# Patient Record
Sex: MALE | Race: WHITE | NOT HISPANIC OR LATINO
[De-identification: names, ages, dates, MRNs, and addresses within clinical notes are randomized per-mention and may not be internally consistent; named-entity substitution may affect disease eponyms.]

---

## 2018-05-15 ENCOUNTER — APPOINTMENT (OUTPATIENT)
Dept: DERMATOLOGY | Facility: CLINIC | Age: 53
End: 2018-05-15
Payer: COMMERCIAL

## 2018-05-15 VITALS — BODY MASS INDEX: 27.77 KG/M2 | WEIGHT: 205 LBS | HEIGHT: 72 IN

## 2018-05-15 DIAGNOSIS — Z00.00 ENCOUNTER FOR GENERAL ADULT MEDICAL EXAMINATION W/OUT ABNORMAL FINDINGS: ICD-10-CM

## 2018-05-15 DIAGNOSIS — Z86.79 PERSONAL HISTORY OF OTHER DISEASES OF THE CIRCULATORY SYSTEM: ICD-10-CM

## 2018-05-15 DIAGNOSIS — Z85.9 PERSONAL HISTORY OF MALIGNANT NEOPLASM, UNSPECIFIED: ICD-10-CM

## 2018-05-15 DIAGNOSIS — Z87.19 PERSONAL HISTORY OF OTHER DISEASES OF THE DIGESTIVE SYSTEM: ICD-10-CM

## 2018-05-15 PROCEDURE — 99213 OFFICE O/P EST LOW 20 MIN: CPT

## 2018-05-15 RX ORDER — SIMVASTATIN 5 MG/1
5 TABLET, FILM COATED ORAL
Refills: 0 | Status: ACTIVE | COMMUNITY

## 2018-05-15 RX ORDER — LOSARTAN POTASSIUM 100 MG/1
100 TABLET, FILM COATED ORAL
Refills: 0 | Status: ACTIVE | COMMUNITY

## 2018-10-30 ENCOUNTER — RX RENEWAL (OUTPATIENT)
Age: 53
End: 2018-10-30

## 2018-10-30 RX ORDER — VALACYCLOVIR HYDROCHLORIDE 500 MG/1
500 TABLET, FILM COATED ORAL TWICE DAILY
Qty: 30 | Refills: 3 | Status: ACTIVE | COMMUNITY
Start: 2018-05-15 | End: 1900-01-01

## 2019-06-07 ENCOUNTER — RX RENEWAL (OUTPATIENT)
Age: 54
End: 2019-06-07

## 2019-11-24 ENCOUNTER — RX RENEWAL (OUTPATIENT)
Age: 54
End: 2019-11-24

## 2020-01-27 ENCOUNTER — RX RENEWAL (OUTPATIENT)
Age: 55
End: 2020-01-27

## 2020-03-24 ENCOUNTER — APPOINTMENT (OUTPATIENT)
Dept: GASTROENTEROLOGY | Facility: CLINIC | Age: 55
End: 2020-03-24

## 2020-05-18 ENCOUNTER — RX RENEWAL (OUTPATIENT)
Age: 55
End: 2020-05-18

## 2020-08-27 ENCOUNTER — APPOINTMENT (OUTPATIENT)
Dept: GASTROENTEROLOGY | Facility: CLINIC | Age: 55
End: 2020-08-27
Payer: COMMERCIAL

## 2020-08-27 VITALS
BODY MASS INDEX: 27.09 KG/M2 | DIASTOLIC BLOOD PRESSURE: 84 MMHG | HEART RATE: 66 BPM | HEIGHT: 72 IN | WEIGHT: 200 LBS | SYSTOLIC BLOOD PRESSURE: 132 MMHG

## 2020-08-27 DIAGNOSIS — Z12.11 ENCOUNTER FOR SCREENING FOR MALIGNANT NEOPLASM OF COLON: ICD-10-CM

## 2020-08-27 PROCEDURE — 99213 OFFICE O/P EST LOW 20 MIN: CPT

## 2020-09-02 NOTE — HISTORY OF PRESENT ILLNESS
[de-identified] : 56yo male for f/u GERD\par He has been maintained on dexilant for years as only medication that works to prevent severe heartburn.  He will get occasional  breakthrough discomfort with dietary indiscretion\par He has never had screening colonoscopy\par He denies rectal bleeding or change in BM

## 2020-09-02 NOTE — PHYSICAL EXAM
[General Appearance - In No Acute Distress] : in no acute distress [General Appearance - Alert] : alert [Extraocular Movements] : extraocular movements were intact [PERRL With Normal Accommodation] : pupils were equal in size, round, and reactive to light [Sclera] : the sclera and conjunctiva were normal [Auscultation Breath Sounds / Voice Sounds] : lungs were clear to auscultation bilaterally [Heart Rate And Rhythm] : heart rate was normal and rhythm regular [Heart Sounds Gallop] : no gallops [Murmurs] : no murmurs [Heart Sounds] : normal S1 and S2 [Heart Sounds Pericardial Friction Rub] : no pericardial rub [Bowel Sounds] : normal bowel sounds [Abdomen Mass (___ Cm)] : no abdominal mass palpated [] : no hepato-splenomegaly [Abdomen Soft] : soft [Abdomen Tenderness] : non-tender [Abnormal Walk] : normal gait [Nail Clubbing] : no clubbing  or cyanosis of the fingernails [Oriented To Time, Place, And Person] : oriented to person, place, and time [Musculoskeletal - Swelling] : no joint swelling seen [Motor Tone] : muscle strength and tone were normal [Affect] : the affect was normal [Impaired Insight] : insight and judgment were intact

## 2020-09-02 NOTE — ASSESSMENT
[FreeTextEntry1] : 54yo male with gerd for initial screening colonoscopy\par check egd and colon clenpiq\par maintain dexilant for gerd\par Risks and benefits of procedure(s) discussed with patient in detail, including but not limited to, perforation, bleeding, reaction to anesthesia, missed lesions.\par

## 2020-09-02 NOTE — ASSESSMENT
[FreeTextEntry1] : 56yo male with gerd for initial screening colonoscopy\par check egd and colon clenpiq\par maintain dexilant for gerd\par Risks and benefits of procedure(s) discussed with patient in detail, including but not limited to, perforation, bleeding, reaction to anesthesia, missed lesions.\par  [FreeTextEntry1] : lab results\par  [de-identified] : patient here today to review and discuss labs results, no acute complains\par

## 2020-09-02 NOTE — HISTORY OF PRESENT ILLNESS
[de-identified] : 56yo male for f/u GERD\par He has been maintained on dexilant for years as only medication that works to prevent severe heartburn.  He will get occasional  breakthrough discomfort with dietary indiscretion\par He has never had screening colonoscopy\par He denies rectal bleeding or change in BM

## 2020-09-21 DIAGNOSIS — Z01.818 ENCOUNTER FOR OTHER PREPROCEDURAL EXAMINATION: ICD-10-CM

## 2020-09-22 ENCOUNTER — APPOINTMENT (OUTPATIENT)
Dept: DISASTER EMERGENCY | Facility: CLINIC | Age: 55
End: 2020-09-22

## 2020-09-23 ENCOUNTER — APPOINTMENT (OUTPATIENT)
Dept: DISASTER EMERGENCY | Facility: CLINIC | Age: 55
End: 2020-09-23

## 2020-09-23 LAB — SARS-COV-2 N GENE NPH QL NAA+PROBE: NOT DETECTED

## 2020-09-25 ENCOUNTER — APPOINTMENT (OUTPATIENT)
Dept: GASTROENTEROLOGY | Facility: AMBULATORY MEDICAL SERVICES | Age: 55
End: 2020-09-25
Payer: COMMERCIAL

## 2020-09-25 ENCOUNTER — RESULT REVIEW (OUTPATIENT)
Age: 55
End: 2020-09-25

## 2020-09-25 PROCEDURE — 45378 DIAGNOSTIC COLONOSCOPY: CPT

## 2020-09-25 PROCEDURE — 43239 EGD BIOPSY SINGLE/MULTIPLE: CPT

## 2020-10-11 ENCOUNTER — EMERGENCY (EMERGENCY)
Facility: HOSPITAL | Age: 55
LOS: 0 days | Discharge: HOME | End: 2020-10-12
Attending: EMERGENCY MEDICINE
Payer: COMMERCIAL

## 2020-10-11 VITALS — WEIGHT: 199.96 LBS | HEIGHT: 72 IN

## 2020-10-11 DIAGNOSIS — R07.89 OTHER CHEST PAIN: ICD-10-CM

## 2020-10-11 DIAGNOSIS — R00.1 BRADYCARDIA, UNSPECIFIED: ICD-10-CM

## 2020-10-11 DIAGNOSIS — R07.81 PLEURODYNIA: ICD-10-CM

## 2020-10-11 DIAGNOSIS — I10 ESSENTIAL (PRIMARY) HYPERTENSION: ICD-10-CM

## 2020-10-11 DIAGNOSIS — Z84.2 FAMILY HISTORY OF OTHER DISEASES OF THE GENITOURINARY SYSTEM: ICD-10-CM

## 2020-10-11 DIAGNOSIS — Z88.5 ALLERGY STATUS TO NARCOTIC AGENT: ICD-10-CM

## 2020-10-11 DIAGNOSIS — E78.49 OTHER HYPERLIPIDEMIA: ICD-10-CM

## 2020-10-11 PROCEDURE — 71046 X-RAY EXAM CHEST 2 VIEWS: CPT

## 2020-10-11 PROCEDURE — 82550 ASSAY OF CK (CPK): CPT

## 2020-10-11 PROCEDURE — 80053 COMPREHEN METABOLIC PANEL: CPT

## 2020-10-11 PROCEDURE — 84484 ASSAY OF TROPONIN QUANT: CPT

## 2020-10-11 PROCEDURE — 85379 FIBRIN DEGRADATION QUANT: CPT

## 2020-10-11 PROCEDURE — 93005 ELECTROCARDIOGRAM TRACING: CPT

## 2020-10-11 PROCEDURE — 85610 PROTHROMBIN TIME: CPT

## 2020-10-11 PROCEDURE — 99285 EMERGENCY DEPT VISIT HI MDM: CPT

## 2020-10-11 PROCEDURE — 93010 ELECTROCARDIOGRAM REPORT: CPT

## 2020-10-11 PROCEDURE — 85025 COMPLETE CBC W/AUTO DIFF WBC: CPT

## 2020-10-11 PROCEDURE — 36415 COLL VENOUS BLD VENIPUNCTURE: CPT

## 2020-10-11 PROCEDURE — 85730 THROMBOPLASTIN TIME PARTIAL: CPT

## 2020-10-11 PROCEDURE — 81003 URINALYSIS AUTO W/O SCOPE: CPT

## 2020-10-11 PROCEDURE — 99283 EMERGENCY DEPT VISIT LOW MDM: CPT | Mod: 25

## 2020-10-11 NOTE — ED ADULT TRIAGE NOTE - CHIEF COMPLAINT QUOTE
Pt presents to ER c/o intermittent left sided chest/rib pain. Onset of symptoms began around 1500 today spontaneously; denies exerting himself on onset. Denies SOB/injury/fall

## 2020-10-12 VITALS
RESPIRATION RATE: 18 BRPM | OXYGEN SATURATION: 100 % | TEMPERATURE: 98 F | SYSTOLIC BLOOD PRESSURE: 144 MMHG | DIASTOLIC BLOOD PRESSURE: 100 MMHG | HEART RATE: 57 BPM

## 2020-10-12 LAB
ALBUMIN SERPL ELPH-MCNC: 4.1 G/DL — SIGNIFICANT CHANGE UP (ref 3.3–5)
ALP SERPL-CCNC: 87 U/L — SIGNIFICANT CHANGE UP (ref 40–120)
ALT FLD-CCNC: 44 U/L — SIGNIFICANT CHANGE UP (ref 12–78)
ANION GAP SERPL CALC-SCNC: 4 MMOL/L — LOW (ref 5–17)
APPEARANCE UR: CLEAR — SIGNIFICANT CHANGE UP
APTT BLD: 29.5 SEC — SIGNIFICANT CHANGE UP (ref 27.5–35.5)
AST SERPL-CCNC: 30 U/L — SIGNIFICANT CHANGE UP (ref 15–37)
BASOPHILS # BLD AUTO: 0.11 K/UL — SIGNIFICANT CHANGE UP (ref 0–0.2)
BASOPHILS NFR BLD AUTO: 1.1 % — SIGNIFICANT CHANGE UP (ref 0–2)
BILIRUB SERPL-MCNC: 0.3 MG/DL — SIGNIFICANT CHANGE UP (ref 0.2–1.2)
BILIRUB UR-MCNC: NEGATIVE — SIGNIFICANT CHANGE UP
BUN SERPL-MCNC: 15 MG/DL — SIGNIFICANT CHANGE UP (ref 7–23)
CALCIUM SERPL-MCNC: 9.1 MG/DL — SIGNIFICANT CHANGE UP (ref 8.5–10.1)
CHLORIDE SERPL-SCNC: 109 MMOL/L — HIGH (ref 96–108)
CK SERPL-CCNC: 130 U/L — SIGNIFICANT CHANGE UP (ref 26–308)
CO2 SERPL-SCNC: 29 MMOL/L — SIGNIFICANT CHANGE UP (ref 22–31)
COLOR SPEC: YELLOW — SIGNIFICANT CHANGE UP
CREAT SERPL-MCNC: 0.97 MG/DL — SIGNIFICANT CHANGE UP (ref 0.5–1.3)
D DIMER BLD IA.RAPID-MCNC: <150 NG/ML DDU — SIGNIFICANT CHANGE UP
DIFF PNL FLD: NEGATIVE — SIGNIFICANT CHANGE UP
EOSINOPHIL # BLD AUTO: 0.61 K/UL — HIGH (ref 0–0.5)
EOSINOPHIL NFR BLD AUTO: 5.8 % — SIGNIFICANT CHANGE UP (ref 0–6)
GLUCOSE SERPL-MCNC: 78 MG/DL — SIGNIFICANT CHANGE UP (ref 70–99)
GLUCOSE UR QL: NEGATIVE MG/DL — SIGNIFICANT CHANGE UP
HCT VFR BLD CALC: 39.6 % — SIGNIFICANT CHANGE UP (ref 39–50)
HGB BLD-MCNC: 13.7 G/DL — SIGNIFICANT CHANGE UP (ref 13–17)
IMM GRANULOCYTES NFR BLD AUTO: 0.2 % — SIGNIFICANT CHANGE UP (ref 0–1.5)
INR BLD: 0.93 RATIO — SIGNIFICANT CHANGE UP (ref 0.88–1.16)
KETONES UR-MCNC: NEGATIVE — SIGNIFICANT CHANGE UP
LEUKOCYTE ESTERASE UR-ACNC: NEGATIVE — SIGNIFICANT CHANGE UP
LYMPHOCYTES # BLD AUTO: 3.76 K/UL — HIGH (ref 1–3.3)
LYMPHOCYTES # BLD AUTO: 36 % — SIGNIFICANT CHANGE UP (ref 13–44)
MCHC RBC-ENTMCNC: 30.2 PG — SIGNIFICANT CHANGE UP (ref 27–34)
MCHC RBC-ENTMCNC: 34.6 GM/DL — SIGNIFICANT CHANGE UP (ref 32–36)
MCV RBC AUTO: 87.2 FL — SIGNIFICANT CHANGE UP (ref 80–100)
MONOCYTES # BLD AUTO: 0.95 K/UL — HIGH (ref 0–0.9)
MONOCYTES NFR BLD AUTO: 9.1 % — SIGNIFICANT CHANGE UP (ref 2–14)
NEUTROPHILS # BLD AUTO: 4.98 K/UL — SIGNIFICANT CHANGE UP (ref 1.8–7.4)
NEUTROPHILS NFR BLD AUTO: 47.8 % — SIGNIFICANT CHANGE UP (ref 43–77)
NITRITE UR-MCNC: NEGATIVE — SIGNIFICANT CHANGE UP
PH UR: 6.5 — SIGNIFICANT CHANGE UP (ref 5–8)
PLATELET # BLD AUTO: 313 K/UL — SIGNIFICANT CHANGE UP (ref 150–400)
POTASSIUM SERPL-MCNC: 3.8 MMOL/L — SIGNIFICANT CHANGE UP (ref 3.5–5.3)
POTASSIUM SERPL-SCNC: 3.8 MMOL/L — SIGNIFICANT CHANGE UP (ref 3.5–5.3)
PROT SERPL-MCNC: 7.6 GM/DL — SIGNIFICANT CHANGE UP (ref 6–8.3)
PROT UR-MCNC: NEGATIVE MG/DL — SIGNIFICANT CHANGE UP
PROTHROM AB SERPL-ACNC: 10.8 SEC — SIGNIFICANT CHANGE UP (ref 10.6–13.6)
RBC # BLD: 4.54 M/UL — SIGNIFICANT CHANGE UP (ref 4.2–5.8)
RBC # FLD: 13.4 % — SIGNIFICANT CHANGE UP (ref 10.3–14.5)
SODIUM SERPL-SCNC: 142 MMOL/L — SIGNIFICANT CHANGE UP (ref 135–145)
SP GR SPEC: 1.01 — SIGNIFICANT CHANGE UP (ref 1.01–1.02)
TROPONIN I SERPL-MCNC: <0.015 NG/ML — SIGNIFICANT CHANGE UP (ref 0.01–0.04)
UROBILINOGEN FLD QL: NEGATIVE MG/DL — SIGNIFICANT CHANGE UP
WBC # BLD: 10.43 K/UL — SIGNIFICANT CHANGE UP (ref 3.8–10.5)
WBC # FLD AUTO: 10.43 K/UL — SIGNIFICANT CHANGE UP (ref 3.8–10.5)

## 2020-10-12 PROCEDURE — 71046 X-RAY EXAM CHEST 2 VIEWS: CPT | Mod: 26

## 2020-10-12 RX ADMIN — Medication 30 MILLILITER(S): at 01:50

## 2020-10-12 NOTE — ED PROVIDER NOTE - PATIENT PORTAL LINK FT
You can access the FollowMyHealth Patient Portal offered by [facility] by registering at the following website: [email or web address]. By joining [facility]’s FollowMyHealth portal, you will also be able to view your health information using other applications (apps) compatible with our system.

## 2020-10-12 NOTE — ED PROVIDER NOTE - FAMILY HISTORY
Sibling  Still living? Yes, Estimated age: Age Unknown  Family history of kidney stones, Age at diagnosis: Age Unknown

## 2020-10-12 NOTE — ED ADULT NURSE NOTE - OBJECTIVE STATEMENT
pt. presents to ER with c/o chest pain with onset 1hr PTA. Now pt. at 0/10. pt. denies SOB, Palpitations, cough, fevers, flank pain or hematuria.

## 2020-10-12 NOTE — ED PROVIDER NOTE - OBJECTIVE STATEMENT
Pt. is a 56 yo M with a hx of HTN and hyperlipidemia, last stress test with Dr. Mariano at [facility] 2 years ago presents with left sided rib pain.  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  Pain was intermittent, lasting 5-10 minutes.  Patient took 2 aspirin with some relief.  Daughter who is a nurse was concerned and brought him to the ED for evaluation. On arrival, patient had 0/10 chest pain.  Pain is nonradiating.  Patient is a nonsmoker.  Brother has hx of kidney stones.   Patient denies fever, SOB, coughing, back pain, abdominal pain, dysuria, hematuria, nausea or vomiting. No rash.

## 2020-10-12 NOTE — ED PROVIDER NOTE - PROGRESS NOTE DETAILS
Patient had an episode of pain again in left ribs.  Maalox given.  Currently without any pain. Does not wish to have tylenol or toradol while in ED for pain.

## 2020-10-12 NOTE — ED PROVIDER NOTE - CLINICAL SUMMARY MEDICAL DECISION MAKING FREE TEXT BOX
Chest pain in adult; EKG normal; labs sent and d dimer and troponin negative.  If 2nd cardiac enzyme is negative, patient may take ibuprofen at home and f/u with PMD or his cardiologist in 1-2 days.

## 2020-10-12 NOTE — ED PROVIDER NOTE - CARE PROVIDERS DIRECT ADDRESSES
[email or web address]

## 2020-10-12 NOTE — ED ADULT NURSE REASSESSMENT NOTE - NS ED NURSE REASSESS COMMENT FT1
pt. noted resting comfortably in stretcher, no distress noted. denies pain/discomfort. safety maintained. WCTM. pt. is on tele, second troponins pending.

## 2020-10-12 NOTE — ED PROVIDER NOTE - CARE PROVIDER_API CALL
Gloria Álvarez  INTERNAL MEDICINE  [address] [place] [address]  Phone: [phone]  Fax: [phone]  Follow Up Time:

## 2020-10-12 NOTE — ED PROVIDER NOTE - CARDIAC, MLM
Normal rate, regular rhythm.  Heart sounds S1, S2.  No murmurs, rubs or gallops. CHEST WALL: +reproducible localized chest wall tenderness of left lateral rib #7-8

## 2020-10-12 NOTE — ED ADULT NURSE NOTE - CHPI ED NUR SYMPTOMS NEG
no nausea/no fever/no syncope/no back pain/no chills/no congestion/no diaphoresis/no shortness of breath/no vomiting/no dizziness

## 2020-10-31 ENCOUNTER — TRANSCRIPTION ENCOUNTER (OUTPATIENT)
Age: 55
End: 2020-10-31

## 2020-11-16 ENCOUNTER — RX RENEWAL (OUTPATIENT)
Age: 55
End: 2020-11-16

## 2020-12-07 ENCOUNTER — OUTPATIENT (OUTPATIENT)
Dept: OUTPATIENT SERVICES | Facility: HOSPITAL | Age: 55
LOS: 1 days | End: 2020-12-07
Payer: COMMERCIAL

## 2020-12-07 DIAGNOSIS — Z11.59 ENCOUNTER FOR SCREENING FOR OTHER VIRAL DISEASES: ICD-10-CM

## 2020-12-07 PROBLEM — I10 ESSENTIAL (PRIMARY) HYPERTENSION: Chronic | Status: ACTIVE | Noted: 2020-10-12

## 2020-12-07 PROBLEM — E78.49 OTHER HYPERLIPIDEMIA: Chronic | Status: ACTIVE | Noted: 2020-10-12

## 2020-12-07 LAB — SARS-COV-2 RNA SPEC QL NAA+PROBE: SIGNIFICANT CHANGE UP

## 2020-12-07 PROCEDURE — U0003: CPT

## 2020-12-08 DIAGNOSIS — Z11.59 ENCOUNTER FOR SCREENING FOR OTHER VIRAL DISEASES: ICD-10-CM

## 2020-12-21 ENCOUNTER — TRANSCRIPTION ENCOUNTER (OUTPATIENT)
Age: 55
End: 2020-12-21

## 2021-01-04 NOTE — ED ADULT NURSE NOTE - FINAL NURSING ELECTRONIC SIGNATURE
Severe asthma, unspecified whether complicated, unspecified whether persistent Severe asthma, unspecified whether complicated, unspecified whether persistent 12-Oct-2020 04:09

## 2021-05-17 ENCOUNTER — RX RENEWAL (OUTPATIENT)
Age: 56
End: 2021-05-17

## 2021-05-26 ENCOUNTER — RX RENEWAL (OUTPATIENT)
Age: 56
End: 2021-05-26

## 2021-06-29 ENCOUNTER — APPOINTMENT (OUTPATIENT)
Dept: DERMATOLOGY | Facility: CLINIC | Age: 56
End: 2021-06-29
Payer: COMMERCIAL

## 2021-06-29 PROCEDURE — 99072 ADDL SUPL MATRL&STAF TM PHE: CPT

## 2021-06-29 PROCEDURE — 99203 OFFICE O/P NEW LOW 30 MIN: CPT

## 2021-06-29 RX ORDER — LEVOCETIRIZINE DIHYDROCHLORIDE 5 MG/1
5 TABLET, FILM COATED ORAL
Refills: 0 | Status: DISCONTINUED | COMMUNITY
End: 2021-06-29

## 2021-06-29 RX ORDER — FEXOFENADINE HCL 180 MG
180 TABLET ORAL
Refills: 0 | Status: ACTIVE | COMMUNITY

## 2021-06-29 NOTE — ASSESSMENT
[FreeTextEntry1] : A complete skin examination was performed.  There is no evidence of skin cancer.  We discussed the importance of photoprotection, including the use of hats, protective clothing and sunscreens with an SPF of at least 30.  Sun avoidance was also discussed.  The ABCDE's of melanoma was discussed.  Regular skin exams recommended.\par \par Call if changes or irritation of moles.  Discussed at length.

## 2021-06-29 NOTE — PHYSICAL EXAM
[Alert] : alert [Oriented x 3] : ~L oriented x 3 [Well Nourished] : well nourished [Full Body Skin Exam Performed] : performed [FreeTextEntry3] : A full skin exam was performed including the scalp, face (including lips, ears, nose and eyes), neck, chest, abdomen, back, buttocks, upper extremities and lower extremities.  The patient declined examination of the genitalia.  \par The exam revealed the following benign growths:\par Dougherty pigmented nevi - includes 9 mm darkly and homogeneously hyperpigmented macule on the right superior shoulder.  ELM bland.  Also with 3 mm dome shaped mildly hyperpigmented papule, right foot, in the great and 2nd toe web space.\par Cherry angioma - trunk.\par

## 2021-06-29 NOTE — HISTORY OF PRESENT ILLNESS
[FreeTextEntry1] : Patient presents for skin examination. [de-identified] : Notes many moles, but denies changes, bleeding, growth or new lesions.

## 2021-09-24 ENCOUNTER — TRANSCRIPTION ENCOUNTER (OUTPATIENT)
Age: 56
End: 2021-09-24

## 2021-11-21 ENCOUNTER — TRANSCRIPTION ENCOUNTER (OUTPATIENT)
Age: 56
End: 2021-11-21

## 2021-11-22 ENCOUNTER — RX RENEWAL (OUTPATIENT)
Age: 56
End: 2021-11-22

## 2021-11-24 ENCOUNTER — TRANSCRIPTION ENCOUNTER (OUTPATIENT)
Age: 56
End: 2021-11-24

## 2021-11-27 ENCOUNTER — TRANSCRIPTION ENCOUNTER (OUTPATIENT)
Age: 56
End: 2021-11-27

## 2021-11-28 ENCOUNTER — TRANSCRIPTION ENCOUNTER (OUTPATIENT)
Age: 56
End: 2021-11-28

## 2021-11-30 ENCOUNTER — OUTPATIENT (OUTPATIENT)
Dept: OUTPATIENT SERVICES | Facility: HOSPITAL | Age: 56
LOS: 1 days | End: 2021-11-30
Payer: COMMERCIAL

## 2021-11-30 ENCOUNTER — APPOINTMENT (OUTPATIENT)
Dept: DISASTER EMERGENCY | Facility: HOSPITAL | Age: 56
End: 2021-11-30

## 2021-11-30 VITALS
TEMPERATURE: 99 F | OXYGEN SATURATION: 98 % | HEART RATE: 64 BPM | RESPIRATION RATE: 18 BRPM | SYSTOLIC BLOOD PRESSURE: 134 MMHG | DIASTOLIC BLOOD PRESSURE: 85 MMHG

## 2021-11-30 VITALS
SYSTOLIC BLOOD PRESSURE: 172 MMHG | OXYGEN SATURATION: 99 % | TEMPERATURE: 99 F | HEIGHT: 72 IN | HEART RATE: 72 BPM | WEIGHT: 199.96 LBS | DIASTOLIC BLOOD PRESSURE: 94 MMHG | RESPIRATION RATE: 18 BRPM

## 2021-11-30 DIAGNOSIS — U07.1 COVID-19: ICD-10-CM

## 2021-11-30 PROCEDURE — M0243: CPT

## 2021-11-30 RX ORDER — SODIUM CHLORIDE 9 MG/ML
250 INJECTION INTRAMUSCULAR; INTRAVENOUS; SUBCUTANEOUS
Refills: 0 | Status: COMPLETED | OUTPATIENT
Start: 2021-11-30 | End: 2021-11-30

## 2021-11-30 RX ADMIN — SODIUM CHLORIDE 310 MILLILITER(S): 9 INJECTION INTRAMUSCULAR; INTRAVENOUS; SUBCUTANEOUS at 12:19

## 2021-11-30 NOTE — CHART NOTE - NSCHARTNOTEFT_GEN_A_CORE
CC: Monoclonal Antibody Infusion/COVID 19 Positive on 11/27/21      History: Patient presents for infusion of monoclonal antibody infusion. Patient has been screened and was deemed to be a candidate.    Symptoms/ Criteria: Cough, HA,    Exposure: wife    Vaccination Status: Pfizer x 2    Risk Profile includes: HTN, HLD          T(C): 37.2 (11-30-21 @ 11:16), Max: 37.2 (11-30-21 @ 11:16)  HR: 72 (11-30-21 @ 11:16) (72 - 72)  BP: 172/94 (11-30-21 @ 11:16) (172/94 - 172/94)  RR: 18 (11-30-21 @ 11:16) (18 - 18)  SpO2: 99% (11-30-21 @ 11:16) (99% - 99%)      PE:   Appearance: NAD	  HEENT:   Normal oral mucosa.   Lymphatic: No lymphadenopathy  Cardiovascular: Normal S1 S2, No JVD, No murmurs, No edema  Respiratory: Lungs clear to auscultation	  Gastrointestinal:  Soft, Non-tender. No guarding   Skin: warm and dry  Neurologic: Non-focal  Extremities: Normal range of motion.     ASSESSMENT:  Pt is a 56y year old Male Covid +  referred to the infusion center for Monoclonal antibody infusion (Regeneron).        PLAN:  - infusion procedure explained to patient   - Consent for monoclonal antibody infusion obtained   - Risk & benefits discussed/all questions answered  - infuse Regeneron over one hour   - will observe patient for one hour post infusion  and then if stable discharge home with oupt follow up as planned by PMD.
Discharge:  T(C): 37.4 (11-30-21 @ 13:40), Max: 37.4 (11-30-21 @ 13:40)  HR: 64 (11-30-21 @ 13:40) (60 - 72)  BP: 134/85 (11-30-21 @ 13:40) (134/85 - 172/94)  RR: 18 (11-30-21 @ 13:40) (18 - 18)  SpO2: 98% (11-30-21 @ 13:40) (98% - 99%)    Patient tolerated infusion well denies complaints of chest pain/SOB/dizzines/ palps  VSS for discharge home  D/C instructions given/ fact sheet included.  Patient to follow-up with PCP as needed

## 2021-12-01 ENCOUNTER — TRANSCRIPTION ENCOUNTER (OUTPATIENT)
Age: 56
End: 2021-12-01

## 2022-02-08 RX ORDER — VALACYCLOVIR 500 MG/1
500 TABLET, FILM COATED ORAL
Qty: 30 | Refills: 4 | Status: ACTIVE | COMMUNITY
Start: 2020-01-27 | End: 1900-01-01

## 2022-05-11 ENCOUNTER — NON-APPOINTMENT (OUTPATIENT)
Age: 57
End: 2022-05-11

## 2022-05-12 ENCOUNTER — APPOINTMENT (OUTPATIENT)
Dept: DERMATOLOGY | Facility: CLINIC | Age: 57
End: 2022-05-12
Payer: COMMERCIAL

## 2022-05-12 DIAGNOSIS — B00.9 HERPESVIRAL INFECTION, UNSPECIFIED: ICD-10-CM

## 2022-05-12 PROCEDURE — 99213 OFFICE O/P EST LOW 20 MIN: CPT

## 2022-05-12 NOTE — HISTORY OF PRESENT ILLNESS
[FreeTextEntry1] : Patient presents for skin examination. [de-identified] : Notes increasing number of cold sores, approx. 4 episodes this year so far, all upper lip.

## 2022-05-12 NOTE — PHYSICAL EXAM
[Alert] : alert [Oriented x 3] : ~L oriented x 3 [Well Nourished] : well nourished [Full Body Skin Exam Performed] : performed [FreeTextEntry3] : A full skin exam was performed including the scalp, face (including lips, ears, nose and eyes), neck, chest, abdomen, back, buttocks, upper extremities and lower extremities.  The patient declined examination of the genitalia.  \par The exam revealed the following benign growths:\par Clark pigmented nevi - includes 9.7 mm hyperpigmented macule, superior to the right clavicle.  Color is homogeneous and border is smooth.  ELM without haze, regression, pseudopods or telangiectasias.\par Cherry angioma.\par \par Small crust, upper lip.

## 2022-05-12 NOTE — ASSESSMENT
[FreeTextEntry1] : A complete skin examination was performed.  There is no evidence of skin cancer.  We discussed the importance of photoprotection, including the use of hats, protective clothing and sunscreens with an SPF of at least 30.  Sun avoidance was also discussed.  The ABCDE's of melanoma was discussed.  Regular skin exams recommended.\par \par Benign pigmented nevi - includes the right superior shoulder region.\par No significant change from 2021.\par Education - follow.  Patient to call if he notes any change to this or other nevi.\par \par H. simplex.\par Numerous flares.  Will tx with suppressive tx.\par Valtrex 500mg qd.   If flares, take bid x 5 days, then resume qd tx.

## 2022-06-02 ENCOUNTER — NON-APPOINTMENT (OUTPATIENT)
Age: 57
End: 2022-06-02

## 2022-09-11 ENCOUNTER — INPATIENT (INPATIENT)
Facility: HOSPITAL | Age: 57
LOS: 0 days | Discharge: HOME | DRG: 313 | End: 2022-09-12
Attending: FAMILY MEDICINE | Admitting: HOSPITALIST
Payer: COMMERCIAL

## 2022-09-11 VITALS
SYSTOLIC BLOOD PRESSURE: 156 MMHG | TEMPERATURE: 98 F | OXYGEN SATURATION: 97 % | RESPIRATION RATE: 18 BRPM | DIASTOLIC BLOOD PRESSURE: 90 MMHG | HEART RATE: 65 BPM

## 2022-09-11 DIAGNOSIS — R07.9 CHEST PAIN, UNSPECIFIED: ICD-10-CM

## 2022-09-11 LAB
ALBUMIN SERPL ELPH-MCNC: 4.3 G/DL — SIGNIFICANT CHANGE UP (ref 3.3–5)
ALP SERPL-CCNC: 87 U/L — SIGNIFICANT CHANGE UP (ref 40–120)
ALT FLD-CCNC: 68 U/L — SIGNIFICANT CHANGE UP (ref 12–78)
ANION GAP SERPL CALC-SCNC: 7 MMOL/L — SIGNIFICANT CHANGE UP (ref 5–17)
AST SERPL-CCNC: 33 U/L — SIGNIFICANT CHANGE UP (ref 15–37)
BASOPHILS # BLD AUTO: 0.08 K/UL — SIGNIFICANT CHANGE UP (ref 0–0.2)
BASOPHILS NFR BLD AUTO: 0.8 % — SIGNIFICANT CHANGE UP (ref 0–2)
BILIRUB SERPL-MCNC: 0.7 MG/DL — SIGNIFICANT CHANGE UP (ref 0.2–1.2)
BUN SERPL-MCNC: 15 MG/DL — SIGNIFICANT CHANGE UP (ref 7–23)
CALCIUM SERPL-MCNC: 9.3 MG/DL — SIGNIFICANT CHANGE UP (ref 8.5–10.1)
CHLORIDE SERPL-SCNC: 98 MMOL/L — SIGNIFICANT CHANGE UP (ref 96–108)
CO2 SERPL-SCNC: 26 MMOL/L — SIGNIFICANT CHANGE UP (ref 22–31)
CREAT SERPL-MCNC: 0.97 MG/DL — SIGNIFICANT CHANGE UP (ref 0.5–1.3)
D DIMER BLD IA.RAPID-MCNC: <150 NG/ML DDU — SIGNIFICANT CHANGE UP
EGFR: 92 ML/MIN/1.73M2 — SIGNIFICANT CHANGE UP
EOSINOPHIL # BLD AUTO: 0.41 K/UL — SIGNIFICANT CHANGE UP (ref 0–0.5)
EOSINOPHIL NFR BLD AUTO: 4.1 % — SIGNIFICANT CHANGE UP (ref 0–6)
GLUCOSE SERPL-MCNC: 89 MG/DL — SIGNIFICANT CHANGE UP (ref 70–99)
HCT VFR BLD CALC: 38 % — LOW (ref 39–50)
HGB BLD-MCNC: 13.6 G/DL — SIGNIFICANT CHANGE UP (ref 13–17)
IMM GRANULOCYTES NFR BLD AUTO: 0.2 % — SIGNIFICANT CHANGE UP (ref 0–1.5)
LIDOCAIN IGE QN: 124 U/L — SIGNIFICANT CHANGE UP (ref 73–393)
LYMPHOCYTES # BLD AUTO: 3.25 K/UL — SIGNIFICANT CHANGE UP (ref 1–3.3)
LYMPHOCYTES # BLD AUTO: 32.8 % — SIGNIFICANT CHANGE UP (ref 13–44)
MAGNESIUM SERPL-MCNC: 2.1 MG/DL — SIGNIFICANT CHANGE UP (ref 1.6–2.6)
MCHC RBC-ENTMCNC: 31.2 PG — SIGNIFICANT CHANGE UP (ref 27–34)
MCHC RBC-ENTMCNC: 35.8 GM/DL — SIGNIFICANT CHANGE UP (ref 32–36)
MCV RBC AUTO: 87.2 FL — SIGNIFICANT CHANGE UP (ref 80–100)
MONOCYTES # BLD AUTO: 0.72 K/UL — SIGNIFICANT CHANGE UP (ref 0–0.9)
MONOCYTES NFR BLD AUTO: 7.3 % — SIGNIFICANT CHANGE UP (ref 2–14)
NEUTROPHILS # BLD AUTO: 5.42 K/UL — SIGNIFICANT CHANGE UP (ref 1.8–7.4)
NEUTROPHILS NFR BLD AUTO: 54.8 % — SIGNIFICANT CHANGE UP (ref 43–77)
NT-PROBNP SERPL-SCNC: 37 PG/ML — SIGNIFICANT CHANGE UP (ref 0–125)
PLATELET # BLD AUTO: 281 K/UL — SIGNIFICANT CHANGE UP (ref 150–400)
POTASSIUM SERPL-MCNC: 3.6 MMOL/L — SIGNIFICANT CHANGE UP (ref 3.5–5.3)
POTASSIUM SERPL-SCNC: 3.6 MMOL/L — SIGNIFICANT CHANGE UP (ref 3.5–5.3)
PROT SERPL-MCNC: 7.8 GM/DL — SIGNIFICANT CHANGE UP (ref 6–8.3)
RBC # BLD: 4.36 M/UL — SIGNIFICANT CHANGE UP (ref 4.2–5.8)
RBC # FLD: 12.6 % — SIGNIFICANT CHANGE UP (ref 10.3–14.5)
SARS-COV-2 RNA SPEC QL NAA+PROBE: SIGNIFICANT CHANGE UP
SODIUM SERPL-SCNC: 131 MMOL/L — LOW (ref 135–145)
TROPONIN I, HIGH SENSITIVITY RESULT: 11 NG/L — SIGNIFICANT CHANGE UP
TROPONIN I, HIGH SENSITIVITY RESULT: 13.91 NG/L — SIGNIFICANT CHANGE UP
WBC # BLD: 9.9 K/UL — SIGNIFICANT CHANGE UP (ref 3.8–10.5)
WBC # FLD AUTO: 9.9 K/UL — SIGNIFICANT CHANGE UP (ref 3.8–10.5)

## 2022-09-11 PROCEDURE — 85610 PROTHROMBIN TIME: CPT

## 2022-09-11 PROCEDURE — 84484 ASSAY OF TROPONIN QUANT: CPT

## 2022-09-11 PROCEDURE — 71275 CT ANGIOGRAPHY CHEST: CPT | Mod: 26

## 2022-09-11 PROCEDURE — 80061 LIPID PANEL: CPT

## 2022-09-11 PROCEDURE — 85025 COMPLETE CBC W/AUTO DIFF WBC: CPT

## 2022-09-11 PROCEDURE — 93010 ELECTROCARDIOGRAM REPORT: CPT

## 2022-09-11 PROCEDURE — 74174 CTA ABD&PLVS W/CONTRAST: CPT | Mod: 26

## 2022-09-11 PROCEDURE — 83036 HEMOGLOBIN GLYCOSYLATED A1C: CPT

## 2022-09-11 PROCEDURE — 74174 CTA ABD&PLVS W/CONTRAST: CPT | Mod: MA

## 2022-09-11 PROCEDURE — 71275 CT ANGIOGRAPHY CHEST: CPT | Mod: MA

## 2022-09-11 PROCEDURE — 71046 X-RAY EXAM CHEST 2 VIEWS: CPT | Mod: 26

## 2022-09-11 PROCEDURE — 80053 COMPREHEN METABOLIC PANEL: CPT

## 2022-09-11 PROCEDURE — 99285 EMERGENCY DEPT VISIT HI MDM: CPT

## 2022-09-11 PROCEDURE — 93306 TTE W/DOPPLER COMPLETE: CPT

## 2022-09-11 PROCEDURE — 99222 1ST HOSP IP/OBS MODERATE 55: CPT

## 2022-09-11 PROCEDURE — 36415 COLL VENOUS BLD VENIPUNCTURE: CPT

## 2022-09-11 RX ORDER — ACETAMINOPHEN 500 MG
650 TABLET ORAL EVERY 6 HOURS
Refills: 0 | Status: DISCONTINUED | OUTPATIENT
Start: 2022-09-11 | End: 2022-09-12

## 2022-09-11 RX ORDER — SODIUM CHLORIDE 9 MG/ML
1000 INJECTION INTRAMUSCULAR; INTRAVENOUS; SUBCUTANEOUS
Refills: 0 | Status: DISCONTINUED | OUTPATIENT
Start: 2022-09-11 | End: 2022-09-12

## 2022-09-11 RX ORDER — HYDROCHLOROTHIAZIDE 25 MG
12.5 TABLET ORAL DAILY
Refills: 0 | Status: DISCONTINUED | OUTPATIENT
Start: 2022-09-11 | End: 2022-09-12

## 2022-09-11 RX ORDER — OLMESARTAN MEDOXOMIL-HYDROCHLOROTHIAZIDE 25; 40 MG/1; MG/1
1 TABLET, FILM COATED ORAL
Qty: 0 | Refills: 0 | DISCHARGE

## 2022-09-11 RX ORDER — ATORVASTATIN CALCIUM 80 MG/1
10 TABLET, FILM COATED ORAL AT BEDTIME
Refills: 0 | Status: DISCONTINUED | OUTPATIENT
Start: 2022-09-11 | End: 2022-09-12

## 2022-09-11 RX ORDER — PANTOPRAZOLE SODIUM 20 MG/1
40 TABLET, DELAYED RELEASE ORAL
Refills: 0 | Status: DISCONTINUED | OUTPATIENT
Start: 2022-09-11 | End: 2022-09-12

## 2022-09-11 RX ORDER — LANOLIN ALCOHOL/MO/W.PET/CERES
3 CREAM (GRAM) TOPICAL AT BEDTIME
Refills: 0 | Status: DISCONTINUED | OUTPATIENT
Start: 2022-09-11 | End: 2022-09-12

## 2022-09-11 RX ORDER — NITROGLYCERIN 6.5 MG
0.4 CAPSULE, EXTENDED RELEASE ORAL
Refills: 0 | Status: DISCONTINUED | OUTPATIENT
Start: 2022-09-11 | End: 2022-09-12

## 2022-09-11 RX ORDER — DEXLANSOPRAZOLE 30 MG/1
1 CAPSULE, DELAYED RELEASE ORAL
Qty: 0 | Refills: 0 | DISCHARGE

## 2022-09-11 RX ORDER — ONDANSETRON 8 MG/1
4 TABLET, FILM COATED ORAL EVERY 8 HOURS
Refills: 0 | Status: DISCONTINUED | OUTPATIENT
Start: 2022-09-11 | End: 2022-09-12

## 2022-09-11 RX ORDER — LOSARTAN POTASSIUM 100 MG/1
100 TABLET, FILM COATED ORAL DAILY
Refills: 0 | Status: DISCONTINUED | OUTPATIENT
Start: 2022-09-11 | End: 2022-09-12

## 2022-09-11 RX ADMIN — ATORVASTATIN CALCIUM 10 MILLIGRAM(S): 80 TABLET, FILM COATED ORAL at 23:18

## 2022-09-11 RX ADMIN — SODIUM CHLORIDE 100 MILLILITER(S): 9 INJECTION INTRAMUSCULAR; INTRAVENOUS; SUBCUTANEOUS at 23:18

## 2022-09-11 NOTE — PATIENT PROFILE ADULT - FALL HARM RISK - UNIVERSAL INTERVENTIONS
Bed in lowest position, wheels locked, appropriate side rails in place/Call bell, personal items and telephone in reach/Instruct patient to call for assistance before getting out of bed or chair/Non-slip footwear when patient is out of bed/[place] to call system/Physically safe environment - no spills, clutter or unnecessary equipment/Purposeful Proactive Rounding/Room/bathroom lighting operational, light cord in reach

## 2022-09-11 NOTE — H&P ADULT - HISTORY OF PRESENT ILLNESS
57 year old male patient with a pertinent past medical history noted for HTN and HLD presented to the ED complaining of a sudden onset of a left sided chest pain. Patient endorses a sharp pain that radiates to his back and is associated with an odd sensation to his left arm( states it is not numb but it also does not feel normal). Patient states he was ambulating to get a glass of water when pain began. Denies any associated shortness of breath, dizziness, pre-syncope, abdominal pain, nausea, vomiting. He does endorse helping his daughter to move to a new apartment 2 weeks ago. Patient states he is scheduled to follow up with a Cardiologist this Month regarding a stress test.

## 2022-09-11 NOTE — ED ADULT TRIAGE NOTE - CHIEF COMPLAINT QUOTE
Pt presents to ED for chest pain that started around 3pm today. Pt states that the pain is stabbing and radiates through to his back. Pt states pain is relieved but is now feeling left arm numbness. no code sroke called by Dr. Shields. EKG in process.

## 2022-09-11 NOTE — H&P ADULT - NSICDXFAMILYHX_GEN_ALL_CORE_FT
FAMILY HISTORY:  Mother  Still living? Unknown  Family history of TIAs, Age at diagnosis: Age Unknown  FH: atrial fibrillation, Age at diagnosis: Age Unknown    Sibling  Still living? Yes, Estimated age: Age Unknown  Family history of kidney stones, Age at diagnosis: Age Unknown

## 2022-09-11 NOTE — ED STATDOCS - OBJECTIVE STATEMENT
57 year old male with PMHx of HTN, HLD, shingles presents to the ED c/o left sided chest pain. At about 3:45 pm today pt began having stabbing left sided chest pain radiating to left shoulder with associated abnormal feeling in left arm. Pt states that the pain persisted for about 4-5 minutes until subsiding. After the initial episode, he has had intermittent "twinges" of chest pain. Denies worsening pain with movement, breathing, change of position. Wife notes pt was clammy Denies feeling SOB, lightheadedness during episode. Wife states that pt does appear off currently. Pt drank EtOH last night and notes some nausea this morning. Denies current episode feeling like prior onset of shingles, as the pain felt different but was on the left side according to prior note. Pt had appointment with cardio last month and has stress test scheduled. Denies smoking. FMHx of Afib, mini strokes, HTN, HLD. Denies FMHx of MI or CAD. Denies fever, cough, sick contacts. Has not had fever, cold or cough over past few weeks. Pt had a lot of heavy lifting while helping daughter move a few weeks ago. Pt is on Aspirin but denies h/o blood clots. Pt also reports traveling every weekend for the past four weekends.  Cardiologist Dr. Shira Segura

## 2022-09-11 NOTE — ED STATDOCS - NS ED ATTENDING STATEMENT MOD
This was a shared visit with the ROSLYN. I reviewed and verified the documentation and independently performed the documented:

## 2022-09-11 NOTE — H&P ADULT - ASSESSMENT
57 year old male patient with Chest pain      -Monitor on Tele overnight        # Chest pain  -patient now pain free but with risk factors  -chest A1C, lipid panel  -serial troponins  -serial EKGs  -get morning echo  -may benefit from a stress test  -Cardiology to evaluate pt    # Hyponatremia  -trial of IVF hydration  -trend sodium    # HTN  -on hctz    # HLD  -on statin    # DVT ppx  -scds    # Disposition  -likely discharge home on 09/12/2022

## 2022-09-11 NOTE — ED STATDOCS - PROGRESS NOTE DETAILS
57 year old male with PMHx of HTN, HLD, shingles presents to the ED c/o left sided chest pain. At about 3:45 pm today pt began having stabbing left sided chest pain radiating to left shoulder with associated abnormal feeling in left arm. Pt states that the pain persisted for about 4-5 minutes until subsiding. After the initial episode, he has had intermittent "twinges" of chest pain. Denies worsening pain with movement, breathing, change of position. Wife notes pt was clammy Denies feeling SOB, lightheadedness during episode. Wife states that pt does appear off currently. Pt drank EtOH last night and notes some nausea this morning. Denies current episode feeling like prior onset of shingles, as the pain felt different but was on the left side according to prior note. Pt had appointment with cardio last month and has stress test scheduled. Denies smoking. FMHx of Afib, mini strokes, HTN, HLD. Denies FMHx of MI or CAD. Denies fever, cough, sick contacts. Has not had fever, cold or cough over past few weeks. Pt had a lot of heavy lifting while helping daughter move a few weeks ago. Pt is on Aspirin but denies h/o blood clots. Pt also reports traveling every weekend for the past four weekends. Cardiac score 4. Spoke with hospitalist dr. Medrano, will admit patient for r/o AMI.  ~Mateo Bob PA-C PA: Patient is a 57 year old male with PMHx of HTN, HLD, shingles who presents to [facility] c/o left sided chest pain radiating to left arm x 1 day. Patient reports heaviness in left arm. At about 3:45 pm today pt began having stabbing left sided chest pain radiating to left shoulder with associated abnormal feeling in left arm. Patient has had intermittent "twinges" of chest pain. Denies worsening pain with movement, breathing, change of position. Wife notes pt was clammy. Denies feeling SOB, lightheadedness during episode. Pt had appointment with cardio last month and has stress test scheduled. Last stress test 4 years ago. FMHx of Afib, mini strokes, HTN, HLD. Denies fever, cough, sick contacts. Has not had fever, cold or cough over past few weeks. Pt had a lot of heavy lifting while helping daughter move a few weeks ago. Pt is on Aspirin but denies h/o blood clots. Pt also reports traveling every weekend for the past four weekends. Non-smoker. ~Mateo Bob PA-C

## 2022-09-11 NOTE — ED STATDOCS - PHYSICAL EXAMINATION
PA NOTE: GEN: AOX3, NAD. HEENT: Throat clear. Airway is patent. EYES: PERRLA. EOMI. Head: NC/AT. NECK: Supple, No JVD. FROM. C-spine non-tender. CV:S1S2, RRR, LUNGS: Non-labored breathing, no tachypnea. O2sat 100% RA. CTA b/l. No w/r/r. CHEST: Equal chest expansion and rise. No deformity. +Mild MSK tenderness left upper chest wall. ABD: Soft, NT/ND, no rebound, no guarding. No CVAT. EXT: No e/c/c. 2+ distal pulses. SKIN: No rashes. NEURO: No focal deficits. CN II-XII intact. FROM. 5/5 motor and sensory. ~Mateo Bob PA-C

## 2022-09-11 NOTE — H&P ADULT - NEGATIVE GASTROINTESTINAL SYMPTOMS
Stephane is here today for chronic gout attacks and to establish care.    Pre-visit Screening:  Immunizations:  up to date  Colonoscopy:  NA  Mammogram: NA  Asthma Action Test/Plan:  NA  PHQ9:  NA  GAD7:  NA  Questioned patient about current smoking habits Pt. quit smoking some time ago.  Ok to leave detailed message on voice mail for today's visit only Yes, phone # [phone]      
no nausea/no vomiting

## 2022-09-11 NOTE — H&P ADULT - NSHPPHYSICALEXAM_GEN_ALL_CORE
Vital Signs Last 24 Hrs  T(C): 36.8 (11 Sep 2022 18:02), Max: 36.8 (11 Sep 2022 18:02)  T(F): 98.2 (11 Sep 2022 18:02), Max: 98.2 (11 Sep 2022 18:02)  HR: 65 (11 Sep 2022 18:02) (65 - 65)  BP: 156/90 (11 Sep 2022 18:02) (156/90 - 156/90)  BP(mean): 109 (11 Sep 2022 18:02) (109 - 109)  RR: 18 (11 Sep 2022 18:02) (18 - 18)  SpO2: 97% (11 Sep 2022 18:02) (97% - 97%)    Parameters below as of 11 Sep 2022 18:02  Patient On (Oxygen Delivery Method): room air

## 2022-09-11 NOTE — ED ADULT NURSE NOTE - OBJECTIVE STATEMENT
Pt presents to ED for chest pain that started around 3pm today. Pt states that the pain is stabbing and radiates through to his back. Pt states pain is relieved but is now feeling left arm numbness/weakness

## 2022-09-11 NOTE — ED STATDOCS - NSICDXFAMILYHX_GEN_ALL_CORE_FT
FAMILY HISTORY:  Sibling  Still living? Yes, Estimated age: Age Unknown  Family history of kidney stones, Age at diagnosis: Age Unknown

## 2022-09-11 NOTE — ED STATDOCS - ATTENDING APP SHARED VISIT CONTRIBUTION OF CARE
I, Gerg Shields, DO personally saw the patient with ROSLYN.  I have personally performed a face to face diagnostic evaluation on this patient.  I have reviewed the ROSLYN note and agree with the history, exam, and plan of care, except as noted.  I personally saw the patient and performed a substantive portion of the visit including all aspects of the medical decision making.

## 2022-09-11 NOTE — ED STATDOCS - CLINICAL SUMMARY MEDICAL DECISION MAKING FREE TEXT BOX
57 year old male with PMHx of HTN, HLD, shingles presents with chest pain x 3 pm. No other associated symptoms. Pain is constant and will intermittently increase in severity. Also feels that his left arm is weak. Vital signs are normal. Exam is normal. Neuro is intact. LUE is 5/5 strength and no weakness in any of the four extremities. Differential dx includes ACS, msk etiology such as muscle spasm given reproducibility of patients pain. Will evaluation for pe with ddimer. Lastly aortic dissection was considered however pt 4 extremities are well perfused without pulse deficit and pain is not tearing or ripping in nature. 57 year old male with PMHx of HTN, HLD, shingles presents with chest pain x 3 pm. No other associated symptoms. Pain is constant and will intermittently increase in severity. Also feels that his left arm is weak. Vital signs are normal. Exam is normal. Neuro is intact. LUE is 5/5 strength and no weakness in any of the four extremities. Differential dx includes ACS, msk etiology such as muscle spasm given reproducibility of patients pain. Will evaluation for pe with ddimer. Lastly aortic dissection was considered however pt 4 extremities are well perfused without pulse deficit and pain is not tearing or ripping in nature.    PA: Patient presented with chest pain radiating to left arm. Cardiac score 4. Admitted to hospitalist dr. Medrano. ~Mateo Bob PA-C

## 2022-09-12 ENCOUNTER — TRANSCRIPTION ENCOUNTER (OUTPATIENT)
Age: 57
End: 2022-09-12

## 2022-09-12 VITALS
RESPIRATION RATE: 18 BRPM | SYSTOLIC BLOOD PRESSURE: 133 MMHG | OXYGEN SATURATION: 100 % | HEART RATE: 74 BPM | DIASTOLIC BLOOD PRESSURE: 74 MMHG | TEMPERATURE: 98 F

## 2022-09-12 DIAGNOSIS — I10 ESSENTIAL (PRIMARY) HYPERTENSION: ICD-10-CM

## 2022-09-12 DIAGNOSIS — E78.5 HYPERLIPIDEMIA, UNSPECIFIED: ICD-10-CM

## 2022-09-12 DIAGNOSIS — R07.9 CHEST PAIN, UNSPECIFIED: ICD-10-CM

## 2022-09-12 DIAGNOSIS — R00.1 BRADYCARDIA, UNSPECIFIED: ICD-10-CM

## 2022-09-12 LAB
A1C WITH ESTIMATED AVERAGE GLUCOSE RESULT: 5.4 % — SIGNIFICANT CHANGE UP (ref 4–5.6)
ALBUMIN SERPL ELPH-MCNC: 4 G/DL — SIGNIFICANT CHANGE UP (ref 3.3–5)
ALP SERPL-CCNC: 85 U/L — SIGNIFICANT CHANGE UP (ref 40–120)
ALT FLD-CCNC: 66 U/L — SIGNIFICANT CHANGE UP (ref 12–78)
ANION GAP SERPL CALC-SCNC: 6 MMOL/L — SIGNIFICANT CHANGE UP (ref 5–17)
AST SERPL-CCNC: 31 U/L — SIGNIFICANT CHANGE UP (ref 15–37)
BASOPHILS # BLD AUTO: 0.06 K/UL — SIGNIFICANT CHANGE UP (ref 0–0.2)
BASOPHILS NFR BLD AUTO: 0.9 % — SIGNIFICANT CHANGE UP (ref 0–2)
BILIRUB SERPL-MCNC: 0.7 MG/DL — SIGNIFICANT CHANGE UP (ref 0.2–1.2)
BUN SERPL-MCNC: 17 MG/DL — SIGNIFICANT CHANGE UP (ref 7–23)
CALCIUM SERPL-MCNC: 9.5 MG/DL — SIGNIFICANT CHANGE UP (ref 8.5–10.1)
CHLORIDE SERPL-SCNC: 106 MMOL/L — SIGNIFICANT CHANGE UP (ref 96–108)
CHOLEST SERPL-MCNC: 165 MG/DL — SIGNIFICANT CHANGE UP
CO2 SERPL-SCNC: 27 MMOL/L — SIGNIFICANT CHANGE UP (ref 22–31)
CREAT SERPL-MCNC: 0.89 MG/DL — SIGNIFICANT CHANGE UP (ref 0.5–1.3)
EGFR: 101 ML/MIN/1.73M2 — SIGNIFICANT CHANGE UP
EOSINOPHIL # BLD AUTO: 0.26 K/UL — SIGNIFICANT CHANGE UP (ref 0–0.5)
EOSINOPHIL NFR BLD AUTO: 3.8 % — SIGNIFICANT CHANGE UP (ref 0–6)
ESTIMATED AVERAGE GLUCOSE: 108 MG/DL — SIGNIFICANT CHANGE UP (ref 68–114)
GLUCOSE SERPL-MCNC: 111 MG/DL — HIGH (ref 70–99)
HCT VFR BLD CALC: 39.3 % — SIGNIFICANT CHANGE UP (ref 39–50)
HDLC SERPL-MCNC: 58 MG/DL — SIGNIFICANT CHANGE UP
HGB BLD-MCNC: 13.9 G/DL — SIGNIFICANT CHANGE UP (ref 13–17)
IMM GRANULOCYTES NFR BLD AUTO: 0.3 % — SIGNIFICANT CHANGE UP (ref 0–1.5)
INR BLD: 1.03 RATIO — SIGNIFICANT CHANGE UP (ref 0.88–1.16)
LIPID PNL WITH DIRECT LDL SERPL: 89 MG/DL — SIGNIFICANT CHANGE UP
LYMPHOCYTES # BLD AUTO: 1.85 K/UL — SIGNIFICANT CHANGE UP (ref 1–3.3)
LYMPHOCYTES # BLD AUTO: 27.4 % — SIGNIFICANT CHANGE UP (ref 13–44)
MCHC RBC-ENTMCNC: 31.2 PG — SIGNIFICANT CHANGE UP (ref 27–34)
MCHC RBC-ENTMCNC: 35.4 GM/DL — SIGNIFICANT CHANGE UP (ref 32–36)
MCV RBC AUTO: 88.3 FL — SIGNIFICANT CHANGE UP (ref 80–100)
MONOCYTES # BLD AUTO: 0.57 K/UL — SIGNIFICANT CHANGE UP (ref 0–0.9)
MONOCYTES NFR BLD AUTO: 8.4 % — SIGNIFICANT CHANGE UP (ref 2–14)
NEUTROPHILS # BLD AUTO: 4 K/UL — SIGNIFICANT CHANGE UP (ref 1.8–7.4)
NEUTROPHILS NFR BLD AUTO: 59.2 % — SIGNIFICANT CHANGE UP (ref 43–77)
NON HDL CHOLESTEROL: 107 MG/DL — SIGNIFICANT CHANGE UP
PLATELET # BLD AUTO: 270 K/UL — SIGNIFICANT CHANGE UP (ref 150–400)
POTASSIUM SERPL-MCNC: 4.7 MMOL/L — SIGNIFICANT CHANGE UP (ref 3.5–5.3)
POTASSIUM SERPL-SCNC: 4.7 MMOL/L — SIGNIFICANT CHANGE UP (ref 3.5–5.3)
PROT SERPL-MCNC: 7.4 GM/DL — SIGNIFICANT CHANGE UP (ref 6–8.3)
PROTHROM AB SERPL-ACNC: 12 SEC — SIGNIFICANT CHANGE UP (ref 10.5–13.4)
RBC # BLD: 4.45 M/UL — SIGNIFICANT CHANGE UP (ref 4.2–5.8)
RBC # FLD: 12.7 % — SIGNIFICANT CHANGE UP (ref 10.3–14.5)
SODIUM SERPL-SCNC: 139 MMOL/L — SIGNIFICANT CHANGE UP (ref 135–145)
TRIGL SERPL-MCNC: 91 MG/DL — SIGNIFICANT CHANGE UP
TROPONIN I, HIGH SENSITIVITY RESULT: 9.96 NG/L — SIGNIFICANT CHANGE UP
WBC # BLD: 6.76 K/UL — SIGNIFICANT CHANGE UP (ref 3.8–10.5)
WBC # FLD AUTO: 6.76 K/UL — SIGNIFICANT CHANGE UP (ref 3.8–10.5)

## 2022-09-12 PROCEDURE — 99223 1ST HOSP IP/OBS HIGH 75: CPT | Mod: 25

## 2022-09-12 PROCEDURE — 93016 CV STRESS TEST SUPVJ ONLY: CPT

## 2022-09-12 PROCEDURE — 99239 HOSP IP/OBS DSCHRG MGMT >30: CPT

## 2022-09-12 PROCEDURE — 93306 TTE W/DOPPLER COMPLETE: CPT | Mod: 26

## 2022-09-12 PROCEDURE — 93018 CV STRESS TEST I&R ONLY: CPT

## 2022-09-12 RX ADMIN — LOSARTAN POTASSIUM 100 MILLIGRAM(S): 100 TABLET, FILM COATED ORAL at 09:42

## 2022-09-12 RX ADMIN — PANTOPRAZOLE SODIUM 40 MILLIGRAM(S): 20 TABLET, DELAYED RELEASE ORAL at 05:54

## 2022-09-12 RX ADMIN — Medication 12.5 MILLIGRAM(S): at 09:43

## 2022-09-12 NOTE — CONSULT NOTE ADULT - PROBLEM SELECTOR RECOMMENDATION 9
CP fully resolved  ACS R/O'd  Troponin negativex3  EKG W/O Ischemic findings  Echo NL LV FX No RWMA  For stress today

## 2022-09-12 NOTE — PROGRESS NOTE ADULT - ASSESSMENT
57 year old male patient with a pertinent past medical history noted for HTN and HLD admitted with:    #chest pain - resolved  - continue to monitor on tele  - ACS ruled out  - echo results pending  - exercise stress test this afternoon  - cardio consult appreciated - d/w Dr. Collins    #HTN  - continue HCTZ and losartan    #HLD  - continue atorvastatin    #DVT prophylaxis  - SCDs, ambulation    dispo planning pending results of exercise stress test today

## 2022-09-12 NOTE — CONSULT NOTE ADULT - SUBJECTIVE AND OBJECTIVE BOX
PCP      CHIEF COMPLAINT:    HPI:  58 Y/O male PMH: HLD, HTN followed with Cardiologist @ [facility] presented to the ER with CC sudden onset of resting sharp left sided chest pain radiating to his back associated with a Left arm " sensation" described as a brief weakness W/O numbness denying motor or sensory deficits states symptoms resolved spontaneously W/O reoccurrence;  Denies any associated SOB/Palpitatons/dizziness/Syncope He does endorse helping his daughter to move to a new apartment 2 weeks ago.  Patient states he is scheduled to follow up with a Cardiologist this Month regarding a stress test.    ACS R/O'd  Tele Sinus Bradycardia       PAST MEDICAL & SURGICAL HISTORY:  Hypertension, unspecified type      Other hyperlipidemia          Allergies    codeine (Unknown)    Intolerances        SOCIAL HISTORY:    FAMILY HISTORY:  Family history of kidney stones (Sibling)    FH: atrial fibrillation (Mother)    Family history of TIAs (Mother)      MEDICATIONS  (STANDING):  atorvastatin 10 milliGRAM(s) Oral at bedtime  hydrochlorothiazide 12.5 milliGRAM(s) Oral daily  losartan 100 milliGRAM(s) Oral daily  pantoprazole    Tablet 40 milliGRAM(s) Oral before breakfast  sodium chloride 0.9%. 1000 milliLiter(s) (100 mL/Hr) IV Continuous <Continuous>    MEDICATIONS  (PRN):  acetaminophen     Tablet .. 650 milliGRAM(s) Oral every 6 hours PRN Temp greater or equal to 38C (100.4F), Mild Pain (1 - 3)  aluminum hydroxide/magnesium hydroxide/simethicone Suspension 30 milliLiter(s) Oral every 4 hours PRN Dyspepsia  melatonin 3 milliGRAM(s) Oral at bedtime PRN Insomnia  nitroglycerin     SubLingual 0.4 milliGRAM(s) SubLingual every 5 minutes PRN Chest Pain  ondansetron Injectable 4 milliGRAM(s) IV Push every 8 hours PRN Nausea and/or Vomiting        REVIEW OF SYSTEMS:    CONSTITUTIONAL: No weakness, fevers or chills  EYES/ENT: No visual changes;  No vertigo or throat pain   NECK: No pain or stiffness  RESPIRATORY: No cough, wheezing, hemoptysis; No shortness of breath  CARDIOVASCULAR: + Chest pain ( See HPI )   GASTROINTESTINAL: No abdominal or epigastric pain. No nausea, vomiting, or hematemesis; No diarrhea or constipation. No melena or hematochezia.  GENITOURINARY: No dysuria, frequency or hematuria  NEUROLOGICAL: See HPI  SKIN: No itching, burning, rashes, or lesions   All other review of systems is negative unless indicated above    Vital Signs Last 24 Hrs  T(C): 36.7 (12 Sep 2022 08:40), Max: 36.8 (11 Sep 2022 18:02)  T(F): 98 (12 Sep 2022 08:40), Max: 98.2 (11 Sep 2022 18:02)  HR: 74 (12 Sep 2022 08:40) (61 - 74)  BP: 133/74 (12 Sep 2022 08:40) (133/74 - 156/90)  BP(mean): 95 (11 Sep 2022 23:02) (93 - 109)  RR: 18 (12 Sep 2022 08:40) (16 - 18)  SpO2: 100% (12 Sep 2022 08:40) (96% - 100%)    Parameters below as of 12 Sep 2022 08:40  Patient On (Oxygen Delivery Method): room air            I&O's Summary      PHYSICAL EXAM:    Constitutional: NAD, awake and alert, well-developed  HEENT: PERR, EOMI,  No oral cyananosis.  Neck:  supple,  No JVD  Respiratory: Breath sounds are clear bilaterally  Cardiovascular: S1 and S2, RRR  Gastrointestinal: Abd soft, nontender.   Extremities: No LE Edema  Neurological: A/O x 3, no focal deficits  Musculoskeletal: no calf tenderness.  Skin: No rashes.      LABS: All Labs Reviewed:                        13.9   6.76  )-----------( 270      ( 12 Sep 2022 08:41 )             39.3                         13.6   9.90  )-----------( 281      ( 11 Sep 2022 18:36 )             38.0     12 Sep 2022 08:41    139    |  106    |  17     ----------------------------<  111    4.7     |  27     |  0.89   11 Sep 2022 18:36    131    |  98     |  15     ----------------------------<  89     3.6     |  26     |  0.97     Ca    9.5        12 Sep 2022 08:41  Ca    9.3        11 Sep 2022 18:36  Mg     2.1       11 Sep 2022 18:36    TPro  7.4    /  Alb  4.0    /  TBili  0.7    /  DBili  x      /  AST  31     /  ALT  66     /  AlkPhos  85     12 Sep 2022 08:41  TPro  7.8    /  Alb  4.3    /  TBili  0.7    /  DBili  x      /  AST  33     /  ALT  68     /  AlkPhos  87     11 Sep 2022 18:36    PT/INR - ( 12 Sep 2022 08:41 )   PT: 12.0 sec;   INR: 1.03 ratio               Blood Culture:   09-11 @ 18:36  Pro Bnp 37        RADIOLOGY/EKG:

## 2022-09-12 NOTE — CONSULT NOTE ADULT - PROBLEM SELECTOR RECOMMENDATION 2
Asymptomatic bradycardia HR mid 50's   EKG Sinus bradycardia No Conduction abnormalities noted  No AV makenna blockers   Obtain TSH  Advised OPT Telemetry and sleep study

## 2022-09-12 NOTE — DISCHARGE NOTE PROVIDER - CARE PROVIDER_API CALL
Gloria Álvarez  INTERNAL MEDICINE  [address] [place] [id number]  Phone: [phone]  Fax: [phone]  Follow Up Time: 1-3 days    ANGELA MAGALLON  Internal Medicine  [address] [place] [id number]  Phone: [phone]  Fax: [phone]  Follow Up Time: 1 week

## 2022-09-12 NOTE — DISCHARGE NOTE NURSING/CASE MANAGEMENT/SOCIAL WORK - NSDCPEFALRISK_GEN_ALL_CORE
For information on Fall & Injury Prevention, visit: [email or web address] OR  [email or web address] OR  https://www.cdc.gov/steadi/patient.html

## 2022-09-12 NOTE — DISCHARGE NOTE PROVIDER - NSDCMRMEDTOKEN_GEN_ALL_CORE_FT
Dexilant 60 mg oral delayed release capsule: 1 cap(s) orally once a day  olmesartan-hydrochlorothiazide 40 mg-12.5 mg oral tablet: 1 tab(s) orally once a day

## 2022-09-12 NOTE — DISCHARGE NOTE NURSING/CASE MANAGEMENT/SOCIAL WORK - PATIENT PORTAL LINK FT
You can access the FollowMyHealth Patient Portal offered by [facility] by registering at the following website: [email or web address]. By joining [email or web address]’s FollowMyHealth portal, you will also be able to view your health information using other applications (apps) compatible with our system.

## 2022-09-12 NOTE — DISCHARGE NOTE PROVIDER - PROVIDER TOKENS
PROVIDER:[TOKEN:[[id number]],FOLLOWUP:[1-3 days]],PROVIDER:[TOKEN:[[id number]],FOLLOWUP:[1 week]]

## 2022-09-12 NOTE — DISCHARGE NOTE PROVIDER - NSDCCPCAREPLAN_GEN_ALL_CORE_FT
PRINCIPAL DISCHARGE DIAGNOSIS  Diagnosis: Acute chest pain  Assessment and Plan of Treatment: continue regular home medications. follow up with your regular cardiologist Dr. Segura. echo and stress test here normal. return to the emergency room with worsening symptoms or any new concerns

## 2022-09-12 NOTE — CONSULT NOTE ADULT - NS PANP COMMENT GEN_ALL_CORE FT
agree w/ above.    I supervised exercise treadmill stress test.  excellent functional capacity 12 METs w/ upsloping ST depressions at peak  <1.5 mm - not ischemic.    OK to d/c w/ OP followup w/ cardiology.

## 2022-09-12 NOTE — DISCHARGE NOTE PROVIDER - CARE PROVIDERS DIRECT ADDRESSES
[email or web address]

## 2022-09-12 NOTE — PROGRESS NOTE ADULT - SUBJECTIVE AND OBJECTIVE BOX
HPI:  57 year old male patient with a pertinent past medical history noted for HTN and HLD presented to the ED complaining of a sudden onset of a left sided chest pain. Patient endorses a sharp pain that radiates to his back and is associated with an odd sensation to his left arm( states it is not numb but it also does not feel normal). Patient states he was ambulating to get a glass of water when pain began. Denies any associated shortness of breath, dizziness, pre-syncope, abdominal pain, nausea, vomiting. He does endorse helping his daughter to move to a new apartment 2 weeks ago. Patient states he is scheduled to follow up with a Cardiologist this Month regarding a stress test. (11 Sep 2022 20:15)    9/12: Pt seen and examined. feels well. no further CP or SOB. no events on tele. ACS ruled out. plan for exercise stress test today    REVIEW OF SYSTEMS:  General: NAD, hemodynamically stable, (-)  fever, (-) chills, (-) weakness  HEENT:  Eyes:  No visual loss, blurred vision, double vision or yellow sclerae. Ears, Nose, Throat:  No hearing loss, sneezing, congestion, runny nose or sore throat.  SKIN:  No rash or itching.  CARDIOVASCULAR:  No chest pain, chest pressure or chest discomfort. No palpitations or edema.  RESPIRATORY:  No shortness of breath, cough or sputum.  GASTROINTESTINAL:  No anorexia, nausea, vomiting or diarrhea. No abdominal pain or blood.  NEUROLOGICAL:  No headache, dizziness, syncope, paralysis, ataxia, numbness or tingling in the extremities. No change in bowel or bladder control.  MUSCULOSKELETAL:  No muscle, back pain, joint pain or stiffness.  HEMATOLOGIC:  No anemia, bleeding or bruising.  LYMPHATICS:  No enlarged nodes. No history of splenectomy.  ENDOCRINOLOGIC:  No reports of sweating, cold or heat intolerance. No polyuria or polydipsia.  ALLERGIES:  No history of asthma, hives, eczema or rhinitis.    Vital Signs Last 24 Hrs  T(C): 36.7 (12 Sep 2022 08:40), Max: 36.8 (11 Sep 2022 18:02)  T(F): 98 (12 Sep 2022 08:40), Max: 98.2 (11 Sep 2022 18:02)  HR: 74 (12 Sep 2022 08:40) (61 - 74)  BP: 133/74 (12 Sep 2022 08:40) (133/74 - 156/90)  BP(mean): 95 (11 Sep 2022 23:02) (93 - 109)  RR: 18 (12 Sep 2022 08:40) (16 - 18)  SpO2: 100% (12 Sep 2022 08:40) (96% - 100%)    Parameters below as of 12 Sep 2022 08:40  Patient On (Oxygen Delivery Method): room air    PHYSICAL EXAM:      Constitutional: NAD, well-groomed, well-developed  HEENT: PERRLA, EOMI, Normal Hearing  Neck: No LAD, No JVD  Back: Normal spine flexure, No CVA tenderness  Cardiovascular: S1 and S2, RRR, no M/G/R  Respiratory: CTAB  Gastrointestinal: BS+, soft, NT/ND  Extremities: No peripheral edema  Vascular: 2+ peripheral pulses  Neurological: A/O x 3, no focal deficits  Psychiatric: Normal mood, normal affect  Musculoskeletal: 5/5 strength b/l upper and lower extremities  Skin: No rashes    Labs                              13.9   6.76  )-----------( 270      ( 12 Sep 2022 08:41 )             39.3     12 Sep 2022 08:41    139    |  106    |  17     ----------------------------<  111    4.7     |  27     |  0.89     Ca    9.5        12 Sep 2022 08:41  Mg     2.1       11 Sep 2022 18:36    TPro  7.4    /  Alb  4.0    /  TBili  0.7    /  DBili  x      /  AST  31     /  ALT  66     /  AlkPhos  85     12 Sep 2022 08:41    PT/INR - ( 12 Sep 2022 08:41 )   PT: 12.0 sec;   INR: 1.03 ratio           CAPILLARY BLOOD GLUCOSE        LIVER FUNCTIONS - ( 12 Sep 2022 08:41 )  Alb: 4.0 g/dL / Pro: 7.4 gm/dL / ALK PHOS: 85 U/L / ALT: 66 U/L / AST: 31 U/L / GGT: x           MEDICATIONS  (STANDING):  atorvastatin 10 milliGRAM(s) Oral at bedtime  hydrochlorothiazide 12.5 milliGRAM(s) Oral daily  losartan 100 milliGRAM(s) Oral daily  pantoprazole    Tablet 40 milliGRAM(s) Oral before breakfast  sodium chloride 0.9%. 1000 milliLiter(s) (100 mL/Hr) IV Continuous <Continuous>    MEDICATIONS  (PRN):  acetaminophen     Tablet .. 650 milliGRAM(s) Oral every 6 hours PRN Temp greater or equal to 38C (100.4F), Mild Pain (1 - 3)  aluminum hydroxide/magnesium hydroxide/simethicone Suspension 30 milliLiter(s) Oral every 4 hours PRN Dyspepsia  melatonin 3 milliGRAM(s) Oral at bedtime PRN Insomnia  nitroglycerin     SubLingual 0.4 milliGRAM(s) SubLingual every 5 minutes PRN Chest Pain  ondansetron Injectable 4 milliGRAM(s) IV Push every 8 hours PRN Nausea and/or Vomiting

## 2022-09-12 NOTE — DISCHARGE NOTE PROVIDER - HOSPITAL COURSE
HPI:  57 year old male patient with a pertinent past medical history noted for HTN and HLD presented to the ED complaining of a sudden onset of a left sided chest pain. Patient endorses a sharp pain that radiates to his back and is associated with an odd sensation to his left arm( states it is not numb but it also does not feel normal). Patient states he was ambulating to get a glass of water when pain began. Denies any associated shortness of breath, dizziness, pre-syncope, abdominal pain, nausea, vomiting. He does endorse helping his daughter to move to a new apartment 2 weeks ago. Patient states he is scheduled to follow up with a Cardiologist this Month regarding a stress test. (11 Sep 2022 20:15)    Pt was admitted and placed on tele where he was noted to be sinus bradycardia to 50s. acute coronary syndrome was ruled out with negative cardiac enzymes and no ischemic changes on EKGs. pt seen by cardio Dr. Collins. Echo with normal LVEF of 60% with no regional wall motion abnormalities and with mild MR and TR. pt also had an exercise stress test which was normal. he was cleared by cardio for discharge home. sodium was 131 on admission and improved to 139 with IV fluids.

## 2022-09-15 DIAGNOSIS — Z88.5 ALLERGY STATUS TO NARCOTIC AGENT: ICD-10-CM

## 2022-09-15 DIAGNOSIS — E78.49 OTHER HYPERLIPIDEMIA: ICD-10-CM

## 2022-09-15 DIAGNOSIS — R07.9 CHEST PAIN, UNSPECIFIED: ICD-10-CM

## 2022-09-15 DIAGNOSIS — R00.1 BRADYCARDIA, UNSPECIFIED: ICD-10-CM

## 2022-09-15 DIAGNOSIS — I10 ESSENTIAL (PRIMARY) HYPERTENSION: ICD-10-CM

## 2022-09-15 DIAGNOSIS — E87.1 HYPO-OSMOLALITY AND HYPONATREMIA: ICD-10-CM

## 2022-10-04 RX ORDER — DEXLANSOPRAZOLE 60 MG/1
60 CAPSULE, DELAYED RELEASE ORAL
Qty: 90 | Refills: 1 | Status: ACTIVE | COMMUNITY
Start: 2019-06-07 | End: 1900-01-01

## 2022-10-17 NOTE — ED ADULT NURSE NOTE - EXTENSIONS OF SELF_ADULT
SATURNINO. Thanks for sending him over re: rectal bleeding. We will get an endoscopic evaluation arranged! Please feel free to reach out if we can help with anything else! Thanks Amanda  [phone] 
None

## 2023-01-05 ENCOUNTER — APPOINTMENT (OUTPATIENT)
Dept: ORTHOPEDIC SURGERY | Facility: CLINIC | Age: 58
End: 2023-01-05
Payer: COMMERCIAL

## 2023-01-05 VITALS — WEIGHT: 210 LBS | BODY MASS INDEX: 28.44 KG/M2 | HEIGHT: 72 IN

## 2023-01-05 PROCEDURE — 99213 OFFICE O/P EST LOW 20 MIN: CPT

## 2023-01-05 PROCEDURE — 99214 OFFICE O/P EST MOD 30 MIN: CPT

## 2023-01-05 PROCEDURE — 73080 X-RAY EXAM OF ELBOW: CPT | Mod: LT

## 2023-01-05 RX ORDER — ROSUVASTATIN CALCIUM 10 MG/1
10 TABLET, FILM COATED ORAL
Refills: 0 | Status: ACTIVE | COMMUNITY

## 2023-01-05 RX ORDER — DICLOFENAC SODIUM 20 MG/G
2 SOLUTION TOPICAL
Qty: 1 | Refills: 0 | Status: ACTIVE | COMMUNITY
Start: 2023-01-05 | End: 1900-01-01

## 2023-01-05 RX ORDER — OLMESARTAN MEDOXOMIL / AMLODIPINE BESYLATE / HYDROCHLOROTHIAZIDE 40; 10; 25 MG/1; MG/1; MG/1
TABLET, FILM COATED ORAL
Refills: 0 | Status: ACTIVE | COMMUNITY

## 2023-01-05 NOTE — IMAGING
[Left] : left elbow [de-identified] : The patient is a well appearing 57 year old male of their stated age. \par Neck is supple & nontender to palpation. Negative Spurling's test. \par \par Left Shoulder: \par \par ROM: \par Forward Flexion: 180 degrees \par Abduction: 180 degrees \par ER at 90: 90 degrees \par IR at 90: 45 degrees \par ER at N: 50 degrees \par Motor: \par Abduction: 5 out of 5 \par FPS: 5 out of 5 \par Flexion: 5 out of 5 \par Internal Rotation: 5 out of 5 \par External Rotation: 5 out of 5 \par Provocative Testing: \par Impingement: Negative \par [place]'s: Negative \par Other: N/A \par -----------------------------------\par Effected Elbow: LEFT\par ROM: \par Flexion: 0-145 degrees \par Supination: 90 degrees \par Pronation: 90 degrees \par on: 90 degrees\par \par Inspection:\par Erythema: None\par Ecchymosis: None\par Abrasions: None\par Effusion: None\par Deformity: None\par \par Palpation:\par Crepitus: None\par Medial Epicondyle/Flexor-Pronator: Nontender\par Lateral Epicondyle/ECRB: TENDER \par Olecranon: Nontender\par Radial Head: Nontender\par Humeral Head: Nontender \par Distal Biceps: Nontender\par Distal Triceps: Nontender\par Flexor-Pronator: Nontender\par Extensor/ECRB: TENDER \par UCL: Nontender\par Pronator Intersection: Nontender\par Ulnar Nerve:  Stable & Nontender\par \par Stress Testing:\par Varus at 0 Degrees: Stable\par Varus at 30 Degrees: Stable\par Valgus at 0 Degrees: Stable\par Valgus at 30 Degrees: Stable\par \par Motor:\par Elbow Flexion: 5 out of 5\par Elbow Extension: 5 out of 5\par Supination: 5 out of 5\par Pronation: 5 out of 5\par Wrist Flexion: 5 out of 5\par Wrist Extension: 5 out of 5 WITH PAIN\par Interossei: 5 out of 5\par : 5 out of 5\par \par Provocative Testing:\par Milking: Negative\par Moving Valgus Stress: Negative\par Posterolateral R-I: Negative\par Hook Test: Negative\par Resisted Wrist Extension: No pain \par Resisted Index Finger Extension: No Pain\par Resisted Middle Finger Extension: WITH PAIN\par Resisted Wrist Flexion: No Pain\par Resisted Pronation: No Pain\par \par Neurologic Exam:\par Axillary Nerve:  SLT\par Radial Nerve: SLT\par Median Nerve: SLT\par Ulnar Nerve:  SLT\par Other:  N/A\par Vascular Exam:\par Radial Pulse: 2+\par Ulnar Pulse: 2+\par Capillary Refill: <2 Seconds\par Other Exams: None\par Pertinent Contralateral Elbow Findings: None\par \par Assessment: The patient is a 57 year old man with left shoulder pain and radiographic and physical exam findings consistent with lateral epicondylitis.    \par The patient’s condition is acute\par Documents/Results Reviewed Today: X-Ray left elbow \par Tests/Studies Independently Interpreted Today: X-Ray left elbow reveals small olecranon spur otherwise benign\par Pertinent findings include: Extensor tenderness, lateral epicondyle tenderness, pain with resisted wrist, index finger, and middle finger extension \par Confounding medical conditions/concerns: GERD\par \par Plan: Patient will start physical therapy, HEP, and stretching. Advised patient to obtain Reparel sleeve, counter force strap and cock-up wrist splint - provided with script.  Use brace as discussed. Prescribed Pennsaid and Naproxen for pain management and inflammation - use as discussed and as tolerated. Take OTC antiinflammatories as needed - use as directed. Modify activity as discussed.\par  \par Tests Ordered: None \par Prescription Medications Ordered: 500mg Naproxen and Pennsaid gel \par Braces/DME Ordered: Provided script for counter force strap and wrist splint\par Activity/Work/Sports Status: None\par Additional Instructions: None\par Follow-Up: 4 weeks \par  \par The patient's current medication management of their orthopedic diagnosis was reviewed today:\par (1) We discussed a comprehensive treatment plan that included possible pharmaceutical management involving the use of prescription strength medications including but not limited to options such as oral Naprosyn 500mg BID, once daily Meloxicam 15 mg, or 500-650 mg Tylenol versus over the counter oral medications and topical prescription NSAID Pennsaid vs over the counter Voltaren gel.\par (2) There is a moderate risk of morbidity with further treatment, especially from use of prescription strength medications and possible side effects of these medications which include upset stomach with oral medications, skin reactions to topical medications and cardiac/renal issues with long term use.\par (3) I recommended that the patient follow-up with their medical physician to discuss any significant specific potential issues with long term medication use such as interactions with current medications or with exacerbation of underlying medical comorbidities.\par (4) The benefits and risks associated with use of injectable, oral or topical, prescription and over the counter anti-inflammatory medications were discussed with the patient. The patient voiced understanding of the risks including but not limited to bleeding, stroke, kidney dysfunction, heart disease, and were referred to the black box warning label for further information.\par  \par \par I, Cheyanne Amado attest that this documentation has been prepared under the direction and in the presence of Provider Dr. Alfredo Lopez. \par \par The documentation recorded by the scribe accurately reflects the service I personally performed and the decisions made by me.\par The patient was seen by me under the direct supervision of Dr. Alfredo Lopez.\par  [FreeTextEntry1] : Small olecranon spur otherwise benign

## 2023-01-05 NOTE — HISTORY OF PRESENT ILLNESS
[de-identified] : The patient is a 57 year  old left hand dominant male who presents today complaining of left elbow pain.  \par Date of Injury/Onset: 12/1/22\par Pain:    At Rest: 1/10 \par With Activity:  6/10 \par Mechanism of injury: pt states he was doing yard work and feels pain was caused by overuse\par This is not a Work Related Injury being treated under Worker's Compensation.\par This is not an athletic injury occurring associated with an interscholastic or organized sports team.\par Quality of symptoms: sharp shoot pain with certain movements, weakness with lifting, occasional throbbing pain\par Improves with: rest, topical cream\par Worse with: full elbow extension, gripping, lifting\par Prior treatment: none\par Prior Imaging: none\par Out of work/sport: currently working\par School/Sport/Position/Occupation: [employer], [employer]\par Additional Information: has seen dr. wright in the past for his knee and shoulder\par

## 2023-02-02 ENCOUNTER — APPOINTMENT (OUTPATIENT)
Dept: ORTHOPEDIC SURGERY | Facility: CLINIC | Age: 58
End: 2023-02-02
Payer: COMMERCIAL

## 2023-02-02 VITALS — HEIGHT: 72 IN | BODY MASS INDEX: 28.44 KG/M2 | WEIGHT: 210 LBS

## 2023-02-02 PROCEDURE — 99213 OFFICE O/P EST LOW 20 MIN: CPT

## 2023-02-02 NOTE — HISTORY OF PRESENT ILLNESS
[de-identified] : The patient is a 57 year  old left hand dominant male who presents today complaining of left elbow pain.  \par Date of Injury/Onset: 12/1/22\par Pain:    At Rest: 0/10 \par With Activity:  6/10 \par Mechanism of injury: pt states he was doing yard work and feels pain was caused by overuse\par This is not a Work Related Injury being treated under Worker's Compensation.\par This is not an athletic injury occurring associated with an interscholastic or organized sports team.\par Quality of symptoms: sharp shoot pain with certain movements, weakness with lifting, occasional throbbing pain\par Improves with: rest, topical cream, activity modifcation\par Worse with: weighted full elbow extension, gripping, lifting\par Treatment/Imaging/Studies Since Last Visit: PT 2x/week\par             Reports Available For Review Today: none\par Out of work/sport: currently working\par School/Sport/Position/Occupation: [employer], [employer]\par changes since last visit: patient is doing better, PT is helping, working on strength.\par Additional Information: has seen dr. wright in the past for his knee and shoulder\par

## 2023-02-02 NOTE — IMAGING
[de-identified] : The patient is a well appearing 57 year old male of their stated age. \par Neck is supple & nontender to palpation. Negative Spurling's test. \par \par Left Shoulder: \par \par ROM: \par Forward Flexion: 180 degrees \par Abduction: 180 degrees \par ER at 90: 90 degrees \par IR at 90: 45 degrees \par ER at N: 50 degrees \par Motor: \par Abduction: 5 out of 5 \par FPS: 5 out of 5 \par Flexion: 5 out of 5 \par Internal Rotation: 5 out of 5 \par External Rotation: 5 out of 5 \par Provocative Testing: \par Impingement: Negative \par [place]'s: Negative \par Other: N/A \par -----------------------------------\par Effected Elbow: LEFT\par ROM: \par Flexion: 0-145 degrees \par Supination: 90 degrees \par Pronation: 90 degrees \par on: 90 degrees\par \par Inspection:\par Erythema: None\par Ecchymosis: None\par Abrasions: None\par Effusion: None\par Deformity: None\par \par Palpation:\par Crepitus: None\par Medial Epicondyle/Flexor-Pronator: Nontender\par Lateral Epicondyle/ECRB: TENDER \par Olecranon: Nontender\par Radial Head: Nontender\par Humeral Head: Nontender \par Distal Biceps: Nontender\par Distal Triceps: Nontender\par Flexor-Pronator: Nontender\par Extensor/ECRB: TENDER \par UCL: Nontender\par Pronator Intersection: Nontender\par Ulnar Nerve:  Stable & Nontender\par \par Stress Testing:\par Varus at 0 Degrees: Stable\par Varus at 30 Degrees: Stable\par Valgus at 0 Degrees: Stable\par Valgus at 30 Degrees: Stable\par \par Motor:\par Elbow Flexion: 5 out of 5\par Elbow Extension: 5 out of 5\par Supination: 5 out of 5\par Pronation: 5 out of 5\par Wrist Flexion: 5 out of 5\par Wrist Extension: 5 out of 5 WITH PAIN\par Interossei: 5 out of 5\par : 5 out of 5\par \par Provocative Testing:\par Milking: Negative\par Moving Valgus Stress: Negative\par Posterolateral R-I: Negative\par Hook Test: Negative\par Resisted Wrist Extension: No pain \par Resisted Index Finger Extension: No Pain\par Resisted Middle Finger Extension: No pain\par Resisted Wrist Flexion: No Pain\par Resisted Pronation: No Pain\par \par Neurologic Exam:\par Axillary Nerve:  SLT\par Radial Nerve: SLT\par Median Nerve: SLT\par Ulnar Nerve:  SLT\par Other:  N/A\par Vascular Exam:\par Radial Pulse: 2+\par Ulnar Pulse: 2+\par Capillary Refill: <2 Seconds\par Other Exams: None\par Pertinent Contralateral Elbow Findings: None\par \par Assessment: The patient is a 57 year old man with left elbow pain and radiographic and physical exam findings consistent with lateral epicondylitis.    \par The patient’s condition is acute\par Documents/Results Reviewed Today: None \par Tests/Studies Independently Interpreted Today: None\par Pertinent findings include: Extensor tenderness, lateral epicondyle tenderness, pain with wrist extension, index finger\par Confounding medical conditions/concerns: GERD\par \par Plan: Patient will continue physical therapy, HEP, and stretching. Advised patient to continue use of Reparel sleeve, counter force strap and cock-up wrist splint - provided with script.  Use brace as discussed. Continue use of Pennsaid and Naproxen for pain management and inflammation - use as discussed and as tolerated. Modify activity as discussed.\par  \par Tests Ordered: None \par Prescription Medications Ordered: Continue use of 500mg Naproxen and Pennsaid gel \par Braces/DME Ordered: Continue use of counter force strap and wrist splint\par Activity/Work/Sports Status: None\par Additional Instructions: None\par Follow-Up: 6 weeks \par  \par The patient's current medication management of their orthopedic diagnosis was reviewed today:\par (1) The patient will continue with the PRN use of their prescribed anti-inflammatory.\par (2) There is a moderate risk of morbidity with further treatment, especially from use of prescription strength medications and possible side effects of these medications which include upset stomach with oral medications, skin reactions to topical medications and cardiac/renal issues with long term use.\par (3) I recommended that the patient follow-up with their medical physician to discuss any significant specific potential issues with long term medication use such as interactions with current medications or with exacerbation of underlying medical comorbidities.\par (4) The benefits and risks associated with use of injectable, oral or topical, prescription and over the counter anti-inflammatory medications were discussed with the patient. The patient voiced understanding of the risks including but not limited to bleeding, stroke, kidney dysfunction, heart disease, and were referred to the black box warning label for further information. \par (5) We discussed a comprehensive treatment plan that included possible pharmaceutical management involving the use of prescription strength medications including but not limited to options such as oral Naprosyn 500mg BID, once daily Meloxicam 15 mg, or 500-650 mg Tylenol versus over the counter oral medications and topical prescription NSAID Pennsaid vs over the counter Voltaren gel.  Based on our extensive discussion, the patient was prescribed topical Pennsaid BID for two weeks to be applied to the effected area.  It will then be used PRN for pain, inflammation and discomfort.  Patient will DC Pennsaid if skin irritation occurs.\par (6) We discussed a comprehensive treatment plan that included possible pharmaceutical management involving the use of prescription strength medications including but not limited to options such as oral Naprosyn 500mg BID, once daily Meloxicam 15 mg, or 500-650 mg Tylenol versus over the counter oral medications and topical prescription NSAID Pennsaid vs over the counter Voltaren gel.  Based on our extensive discussion, the patient was prescribed Naprosyn 500mg BID for two weeks.  It will then be used PRN for pain, inflammation and discomfort.\par \par Cheyanne COOPER attest that this documentation has been prepared under the direction and in the presence of Provider Dr. Alfredo Lopez. \par \par The documentation recorded by the scribe accurately reflects the services I, Dr. Alfredo Lopez, personally performed and the decisions made by me.\par

## 2023-03-14 ENCOUNTER — APPOINTMENT (OUTPATIENT)
Dept: GASTROENTEROLOGY | Facility: CLINIC | Age: 58
End: 2023-03-14
Payer: COMMERCIAL

## 2023-03-14 VITALS
HEART RATE: 64 BPM | BODY MASS INDEX: 28.04 KG/M2 | DIASTOLIC BLOOD PRESSURE: 74 MMHG | WEIGHT: 207 LBS | SYSTOLIC BLOOD PRESSURE: 137 MMHG | HEIGHT: 72 IN

## 2023-03-14 DIAGNOSIS — K21.9 GASTRO-ESOPHAGEAL REFLUX DISEASE W/OUT ESOPHAGITIS: ICD-10-CM

## 2023-03-14 PROCEDURE — 99214 OFFICE O/P EST MOD 30 MIN: CPT

## 2023-03-16 ENCOUNTER — APPOINTMENT (OUTPATIENT)
Dept: ORTHOPEDIC SURGERY | Facility: CLINIC | Age: 58
End: 2023-03-16
Payer: COMMERCIAL

## 2023-03-16 VITALS — HEIGHT: 72 IN | WEIGHT: 207 LBS | BODY MASS INDEX: 28.04 KG/M2

## 2023-03-16 PROCEDURE — 99213 OFFICE O/P EST LOW 20 MIN: CPT

## 2023-03-16 NOTE — IMAGING
[de-identified] : The patient is a well appearing 57 year old male of their stated age. \par Neck is supple & nontender to palpation. Negative Spurling's test. \par \par Left Shoulder: \par ROM: \par Forward Flexion: 180 degrees \par Abduction: 180 degrees \par ER at 90: 90 degrees \par IR at 90: 45 degrees \par ER at N: 50 degrees \par Motor: \par Abduction: 5 out of 5 \par FPS: 5 out of 5 \par Flexion: 5 out of 5 \par Internal Rotation: 5 out of 5 \par External Rotation: 5 out of 5 \par Provocative Testing: \par Impingement: Negative \par [place]'s: Negative \par Other: N/A \par -----------------------------------\par Effected Elbow: LEFT\par ROM: \par Flexion: 0-145 degrees \par Supination: 90 degrees \par Pronation: 90 degrees \par on: 90 degrees\par \par Inspection:\par Erythema: None\par Ecchymosis: None\par Abrasions: None\par Effusion: None\par Deformity: None\par \par Palpation:\par Crepitus: None\par Medial Epicondyle/Flexor-Pronator: Nontender\par Lateral Epicondyle/ECRB: TENDER \par Olecranon: Nontender\par Radial Head: Nontender\par Humeral Head: Nontender \par Distal Biceps: Nontender\par Distal Triceps: Nontender\par Flexor-Pronator: Nontender\par Extensor/ECRB: TENDER \par UCL: Nontender\par Pronator Intersection: Nontender\par Ulnar Nerve:  Stable & Nontender\par \par Stress Testing:\par Varus at 0 Degrees: Stable\par Varus at 30 Degrees: Stable\par Valgus at 0 Degrees: Stable\par Valgus at 30 Degrees: Stable\par \par Motor:\par Elbow Flexion: 5 out of 5\par Elbow Extension: 5 out of 5\par Supination: 5 out of 5\par Pronation: 5 out of 5\par Wrist Flexion: 5 out of 5\par Wrist Extension: 5 out of 5 WITH PAIN\par Interossei: 5 out of 5\par : 5 out of 5\par \par Provocative Testing:\par Milking: Negative\par Moving Valgus Stress: Negative\par Posterolateral R-I: Negative\par Hook Test: Negative\par Resisted Wrist Extension: No pain \par Resisted Index Finger Extension: No Pain\par Resisted Middle Finger Extension: No pain\par Resisted Wrist Flexion: No Pain\par Resisted Pronation: No Pain\par \par Neurologic Exam:\par Axillary Nerve:  SLT\par Radial Nerve: SLT\par Median Nerve: SLT\par Ulnar Nerve:  SLT\par Other:  N/A\par Vascular Exam:\par Radial Pulse: 2+\par Ulnar Pulse: 2+\par Capillary Refill: <2 Seconds\par Other Exams: None\par Pertinent Contralateral Elbow Findings: None\par \par Assessment: The patient is a 57 year old man with left elbow pain and radiographic and physical exam findings consistent with lateral epicondylitis.    \par The patient’s condition is acute\par Documents/Results Reviewed Today: None \par Tests/Studies Independently Interpreted Today: None\par Pertinent findings include: Extensor tenderness, lateral epicondyle tenderness, pain with wrist extension and middle finger extension, no pain with index finger extension \par Confounding medical conditions/concerns: GERD\par \par Plan: Patient must continue physical therapy, HEP, and stretching as he is beginning to gain relief. THIS SERVES AS A LETTER OF MEDICALLY NECESSITY THAT THE PATIENT RETURN TO PHYSICAL THERAPY IN ORDER TO PREVENT SURGICAL INTERVENTION OR REINJURY. Advised patient to continue use of Reparel sleeve, counter force strap and cock-up wrist splint. Continue use of prescribed medications (Pennsaid and Naproxen) for pain management and inflammation - use as discussed and as tolerated. Modify activity as discussed. Follow up in 6 weeks for possible clearance. \par Tests Ordered: None \par Prescription Medications Ordered: Continue use of 500mg Naproxen and Pennsaid gel \par Braces/DME Ordered: Continue use of counter force strap and wrist splint\par Activity/Work/Sports Status: None\par Additional Instructions: None\par Follow-Up: 6 weeks \par  \par The patient's current medication management of their orthopedic diagnosis was reviewed today:\par (1) The patient will continue with the PRN use of their prescribed anti-inflammatory.\par (2) There is a moderate risk of morbidity with further treatment, especially from use of prescription strength medications and possible side effects of these medications which include upset stomach with oral medications, skin reactions to topical medications and cardiac/renal issues with long term use.\par (3) I recommended that the patient follow-up with their medical physician to discuss any significant specific potential issues with long term medication use such as interactions with current medications or with exacerbation of underlying medical comorbidities.\par (4) The benefits and risks associated with use of injectable, oral or topical, prescription and over the counter anti-inflammatory medications were discussed with the patient. The patient voiced understanding of the risks including but not limited to bleeding, stroke, kidney dysfunction, heart disease, and were referred to the black box warning label for further information. \par (5) We discussed a comprehensive treatment plan that included possible pharmaceutical management involving the use of prescription strength medications including but not limited to options such as oral Naprosyn 500mg BID, once daily Meloxicam 15 mg, or 500-650 mg Tylenol versus over the counter oral medications and topical prescription NSAID Pennsaid vs over the counter Voltaren gel.  Based on our extensive discussion, the patient was prescribed topical Pennsaid BID for two weeks to be applied to the effected area.  It will then be used PRN for pain, inflammation and discomfort.  Patient will DC Pennsaid if skin irritation occurs.\par (6) We discussed a comprehensive treatment plan that included possible pharmaceutical management involving the use of prescription strength medications including but not limited to options such as oral Naprosyn 500mg BID, once daily Meloxicam 15 mg, or 500-650 mg Tylenol versus over the counter oral medications and topical prescription NSAID Pennsaid vs over the counter Voltaren gel.  Based on our extensive discussion, the patient was prescribed Naprosyn 500mg BID for two weeks.  It will then be used PRN for pain, inflammation and discomfort.\par \par I, Breanna Akers, attest that this documentation has been prepared under the direction and in the presence of Provider Dr. Alfredo Lopez.\par \par The documentation recorded by the scribe accurately reflects the service IGege PA-C, personally performed and the decisions made by me.\par The patient was seen by me under the direct supervision of Dr. Alfredo Lopez.\par \par

## 2023-03-16 NOTE — HISTORY OF PRESENT ILLNESS
[de-identified] : The patient is a 57 year  old left hand dominant male who presents today complaining of left elbow pain.  \par Date of Injury/Onset: 12/1/22\par Pain:    At Rest: 0/10 \par With Activity:  5/10 \par Mechanism of injury: pt states he was doing yard work and feels pain was caused by overuse\par This is not a Work Related Injury being treated under Worker's Compensation.\par This is not an athletic injury occurring associated with an interscholastic or organized sports team.\par Quality of symptoms: sharp shoot pain with certain movements, weakness with lifting, occasional throbbing pain\par Improves with: rest, topical cream, activity modifcation\par Worse with: weighted full elbow extension, gripping, lifting\par Treatment/Imaging/Studies Since Last Visit: PT 2x/week\par             Reports Available For Review Today: none\par Out of work/sport: currently working\par School/Sport/Position/Occupation: [employer]\par changes since last visit: Patient reports that he is continuing to feel better with PT. \par Additional Information: has seen dr. wright in the past for his knee and shoulder\par

## 2023-03-19 PROBLEM — K21.9 CHRONIC GERD: Status: ACTIVE | Noted: 2019-06-07

## 2023-03-19 NOTE — PHYSICAL EXAM
[Alert] : alert [Normal Voice/Communication] : normal voice/communication [Healthy Appearing] : healthy appearing [No Acute Distress] : no acute distress [Hearing Threshold Finger Rub Not [place]] : hearing was normal [Sclera] : the sclera and conjunctiva were normal [Normal Lips/Gums] : the lips and gums were normal [Oropharynx] : the oropharynx was normal [Normal Appearance] : the appearance of the neck was normal [No Neck Mass] : no neck mass was observed [No Respiratory Distress] : no respiratory distress [No Acc Muscle Use] : no accessory muscle use [Respiration, Rhythm And Depth] : normal respiratory rhythm and effort [Auscultation Breath Sounds / Voice Sounds] : lungs were clear to auscultation bilaterally [Heart Rate And Rhythm] : heart rate was normal and rhythm regular [Normal S1, S2] : normal S1 and S2 [Murmurs] : no murmurs [Bowel Sounds] : normal bowel sounds [Abdomen Tenderness] : non-tender [No Masses] : no abdominal mass palpated [Abdomen Soft] : soft [] : no hepatosplenomegaly [Oriented To Time, Place, And Person] : oriented to person, place, and time

## 2023-03-19 NOTE — HISTORY OF PRESENT ILLNESS
[FreeTextEntry1] : 58yo male with refractory GERD\par \par He has been on dexilant for several years. He has tried and failed all other PPI in the past\par He has run out of medication and again tried omeprazole, pantoprazole, esomeprazole without help\par \par He gets severe heartburn when not on Dexlansoprazole

## 2023-03-19 NOTE — ASSESSMENT
[FreeTextEntry1] : 58yo male with refractory GERD\par \par will prescribe again dexlansoprazole as he has failed all other PPI

## 2023-04-27 ENCOUNTER — APPOINTMENT (OUTPATIENT)
Dept: ORTHOPEDIC SURGERY | Facility: CLINIC | Age: 58
End: 2023-04-27
Payer: COMMERCIAL

## 2023-04-27 VITALS — BODY MASS INDEX: 28.04 KG/M2 | HEIGHT: 72 IN | WEIGHT: 207 LBS

## 2023-04-27 DIAGNOSIS — M25.522 PAIN IN LEFT ELBOW: ICD-10-CM

## 2023-04-27 DIAGNOSIS — M77.12 LATERAL EPICONDYLITIS, LEFT ELBOW: ICD-10-CM

## 2023-04-27 PROCEDURE — 99213 OFFICE O/P EST LOW 20 MIN: CPT

## 2023-04-27 NOTE — HISTORY OF PRESENT ILLNESS
[de-identified] : The patient is a 57 year  old left hand dominant male who presents today complaining of left elbow pain.  \par Date of Injury/Onset: 12/1/22\par Pain:    At Rest: 0/10 \par With Activity:  1-2/10 \par Mechanism of injury: pt states he was doing yard work and feels pain was caused by overuse\par This is not a Work Related Injury being treated under Worker's Compensation.\par This is not an athletic injury occurring associated with an interscholastic or organized sports team.\par Quality of symptoms: sharp shoot pain with certain movements, weakness with lifting, occasional throbbing pain\par Improves with: rest, topical cream, activity modification\par Worse with: weighted full elbow extension, gripping, lifting\par Treatment/Imaging/Studies Since Last Visit: PT if schedule allows, Reparel sleeve, Voltaren Gel \par             Reports Available For Review Today: none\par Out of work/sport: Currently working\par School/Sport/Position/Occupation: [employer], [employer] company\par changes since last visit: Patient reports that he is continuing to feel better with PT, improving significantly since previous visit\par Additional Information: has seen Dr. Lopez in the past for his knee and shoulder\par

## 2023-04-27 NOTE — IMAGING
[de-identified] : The patient is a well appearing 57 year old male of their stated age. \par Neck is supple & nontender to palpation. Negative Spurling's test. \par \par Left Shoulder: \par ROM: \par Forward Flexion: 180 degrees \par Abduction: 180 degrees \par ER at 90: 90 degrees \par IR at 90: 45 degrees \par ER at N: 50 degrees \par Motor: \par Abduction: 5 out of 5 \par FPS: 5 out of 5 \par Flexion: 5 out of 5 \par Internal Rotation: 5 out of 5 \par External Rotation: 5 out of 5 \par Provocative Testing: \par Impingement: Negative \par [place]'s: Negative \par Other: N/A \par -----------------------------------\par Effected Elbow: LEFT\par ROM: \par Flexion: 0-145 degrees \par Supination: 90 degrees \par Pronation: 90 degrees \par \par Inspection:\par Erythema: None\par Ecchymosis: None\par Abrasions: None\par Effusion: None\par Deformity: None\par \par Palpation:\par Crepitus: None\par Medial Epicondyle/Flexor-Pronator: Nontender\par Lateral Epicondyle/ECRB: Nontender \par Olecranon: Nontender\par Radial Head: Nontender\par Humeral Head: Nontender \par Distal Biceps: Nontender\par Distal Triceps: Nontender\par Flexor-Pronator: Nontender\par Extensor/ECRB: Nontender \par UCL: Nontender\par Pronator Intersection: Nontender\par Ulnar Nerve:  Stable & Nontender\par \par Stress Testing:\par Varus at 0 Degrees: Stable\par Varus at 30 Degrees: Stable\par Valgus at 0 Degrees: Stable\par Valgus at 30 Degrees: Stable\par \par Motor:\par Elbow Flexion: 5 out of 5\par Elbow Extension: 5 out of 5\par Supination: 5 out of 5\par Pronation: 5 out of 5\par Wrist Flexion: 5 out of 5\par Wrist Extension: 5 out of 5 \par Interossei: 5 out of 5\par : 5 out of 5\par \par Provocative Testing:\par Milking: Negative\par Moving Valgus Stress: Negative\par Posterolateral R-I: Negative\par Hook Test: Negative\par Resisted Wrist Extension: No pain \par Resisted Index Finger Extension: No Pain\par Resisted Middle Finger Extension: WITH PAIN\par Resisted Wrist Flexion: No Pain\par Resisted Pronation: No Pain\par \par Neurologic Exam:\par Axillary Nerve:  SLT\par Radial Nerve: SLT\par Median Nerve: SLT\par Ulnar Nerve:  SLT\par Other:  N/A\par Vascular Exam:\par Radial Pulse: 2+\par Ulnar Pulse: 2+\par Capillary Refill: <2 Seconds\par Other Exams: None\par Pertinent Contralateral Elbow Findings: None\par \par Assessment: The patient is a 57 year old man with left elbow pain and radiographic and physical exam findings consistent with lateral epicondylitis.    \par The patient’s condition is acute\par Documents/Results Reviewed Today: None \par Tests/Studies Independently Interpreted Today: None\par Pertinent findings include: mild pain with middle finger extension\par Confounding medical conditions/concerns: GERD\par \par Plan: Patient will finish out physical therapy and transition to HEP and stretching. Advised patient to continue use of Reparel sleeve, counter force strap and cock-up wrist splint. Continue use of prescribed medications (Pennsaid and Naproxen) for pain management and inflammation - use as discussed and as tolerated. Continue to modify activity.  \par Tests Ordered: None \par Prescription Medications Ordered: Continue use of 500mg Naproxen and Pennsaid gel \par Braces/DME Ordered: Continue use of counter force strap and wrist splint\par Activity/Work/Sports Status: None\par Additional Instructions: None\par Follow-Up: PRN\par  \par The patient's current medication management of their orthopedic diagnosis was reviewed today:\par (1) The patient will continue with the PRN use of their prescribed anti-inflammatory.\par (2) There is a moderate risk of morbidity with further treatment, especially from use of prescription strength medications and possible side effects of these medications which include upset stomach with oral medications, skin reactions to topical medications and cardiac/renal issues with long term use.\par (3) I recommended that the patient follow-up with their medical physician to discuss any significant specific potential issues with long term medication use such as interactions with current medications or with exacerbation of underlying medical comorbidities.\par (4) The benefits and risks associated with use of injectable, oral or topical, prescription and over the counter anti-inflammatory medications were discussed with the patient. The patient voiced understanding of the risks including but not limited to bleeding, stroke, kidney dysfunction, heart disease, and were referred to the black box warning label for further information.\par \par \par \par I, Breanna Akers, attest that this documentation has been prepared under the direction and in the presence of Provider Dr. Alfredo Lopez.\par \par The documentation recorded by the scribe accurately reflects the services I, Dr. Alfredo Lopez, personally performed and the decisions made by me.\par \par \par

## 2023-05-11 ENCOUNTER — APPOINTMENT (OUTPATIENT)
Dept: DERMATOLOGY | Facility: CLINIC | Age: 58
End: 2023-05-11
Payer: COMMERCIAL

## 2023-05-11 DIAGNOSIS — D17.9 BENIGN LIPOMATOUS NEOPLASM, UNSPECIFIED: ICD-10-CM

## 2023-05-11 DIAGNOSIS — L82.0 INFLAMED SEBORRHEIC KERATOSIS: ICD-10-CM

## 2023-05-11 PROCEDURE — 99213 OFFICE O/P EST LOW 20 MIN: CPT | Mod: 25

## 2023-05-11 PROCEDURE — 17110 DESTRUCTION B9 LES UP TO 14: CPT

## 2023-05-11 NOTE — ASSESSMENT
[FreeTextEntry1] : A complete skin examination was performed.  There is no evidence of skin cancer.  We discussed the importance of photoprotection, including the use of hats, protective clothing and sunscreens with an SPF of at least 30.  Sun avoidance was also discussed.  The ABCDE's of melanoma was discussed.  Regular skin exams recommended.\par \par Lipoma's\par Benign\par Education.

## 2023-05-11 NOTE — PHYSICAL EXAM
[Alert] : alert [Oriented x 3] : ~L oriented x 3 [Well Nourished] : well nourished [Full Body Skin Exam Performed] : performed [FreeTextEntry3] : A full skin exam was performed including the scalp, face (including lips, ears, nose and eyes), neck, chest, abdomen, back, buttocks, upper extremities and lower extremities.  The patient declined examination of the genitalia.  \par The exam revealed the following benign growths:\par Kay pigmented nevi.\par Seborrheic keratoses.\par Cherry angioma.\par Soft SQ mobile nodules of the bilateral forearms.\par \par Erythematous greasy punctate papules, bilateral neck.\par

## 2023-05-11 NOTE — HISTORY OF PRESENT ILLNESS
[FreeTextEntry1] : Patient presents for skin examination. [de-identified] : Irritated lesions of the neck.

## 2023-06-12 ENCOUNTER — APPOINTMENT (OUTPATIENT)
Dept: ORTHOPEDIC SURGERY | Facility: CLINIC | Age: 58
End: 2023-06-12
Payer: COMMERCIAL

## 2023-06-12 VITALS — WEIGHT: 207 LBS | BODY MASS INDEX: 28.04 KG/M2 | HEIGHT: 72 IN

## 2023-06-12 PROCEDURE — 99213 OFFICE O/P EST LOW 20 MIN: CPT | Mod: 25

## 2023-06-12 PROCEDURE — 73610 X-RAY EXAM OF ANKLE: CPT | Mod: 50

## 2023-06-12 NOTE — ASSESSMENT
[FreeTextEntry1] : The nature of plantar fasciits was discussed with the patient, as was its typically self-limiting nature. The patient was advised to wear gel heel pads, use nsaids if able, and recommended to see physical therapy for a stretching program.\par emg to eval for tarsal tunnel component\par f/up 6 wks

## 2023-06-12 NOTE — PHYSICAL EXAM
[Bilateral] : foot and ankle bilaterally [NL (40)] : plantar flexion 40 degrees [NL 30)] : inversion 30 degrees [5___] : eversion 5[unfilled]/5 [2+] : dorsalis pedis pulse: 2+ [NL (20)] : eversion 20 degrees [] : varicosities are present [de-identified] : False [de-identified] : eversion 15 degrees

## 2023-06-12 NOTE — HISTORY OF PRESENT ILLNESS
[8] : 8 [3] : 3 [Dull/Aching] : dull/aching [Localized] : localized [Intermittent] : intermittent [Full time] : Work status: full time [de-identified] : 06/12/2023: long standing plantar heel pain. no recent injury/treatment. in past has been to PT, has csi, orthotics. no prior foot surgery. denies dm/tob. kika azevedo. numbness at times\par  [] : Post Surgical Visit: no [FreeTextEntry1] : Tom heels

## 2023-06-12 NOTE — IMAGING
[Bilateral] : ankle bilaterally [There are no fractures, subluxations or dislocations. No significant abnormalities are seen] : There are no fractures, subluxations or dislocations. No significant abnormalities are seen

## 2023-06-15 ENCOUNTER — APPOINTMENT (OUTPATIENT)
Dept: NEUROLOGY | Facility: CLINIC | Age: 58
End: 2023-06-15
Payer: COMMERCIAL

## 2023-06-15 DIAGNOSIS — M79.671 PAIN IN RIGHT FOOT: ICD-10-CM

## 2023-06-15 DIAGNOSIS — R20.2 ANESTHESIA OF SKIN: ICD-10-CM

## 2023-06-15 DIAGNOSIS — R20.0 ANESTHESIA OF SKIN: ICD-10-CM

## 2023-06-15 DIAGNOSIS — M79.672 PAIN IN RIGHT FOOT: ICD-10-CM

## 2023-06-15 PROCEDURE — 95913 NRV CNDJ TEST 13/> STUDIES: CPT

## 2023-06-17 ENCOUNTER — NON-APPOINTMENT (OUTPATIENT)
Age: 58
End: 2023-06-17

## 2023-06-19 ENCOUNTER — APPOINTMENT (OUTPATIENT)
Dept: ORTHOPEDIC SURGERY | Facility: CLINIC | Age: 58
End: 2023-06-19

## 2023-06-26 ENCOUNTER — APPOINTMENT (OUTPATIENT)
Dept: ORTHOPEDIC SURGERY | Facility: CLINIC | Age: 58
End: 2023-06-26
Payer: COMMERCIAL

## 2023-06-26 VITALS — WEIGHT: 208 LBS | HEIGHT: 72 IN | BODY MASS INDEX: 28.17 KG/M2

## 2023-06-26 DIAGNOSIS — M72.2 PLANTAR FASCIAL FIBROMATOSIS: ICD-10-CM

## 2023-06-26 PROCEDURE — 99213 OFFICE O/P EST LOW 20 MIN: CPT

## 2023-06-26 NOTE — ASSESSMENT
[FreeTextEntry1] : The nature of plantar fasciits was discussed with the patient, as was its typically self-limiting nature. The patient was advised to wear gel heel pads, use nsaids if able, and recommended to see physical therapy for a stretching program.\par f/up 6wks

## 2023-06-26 NOTE — PHYSICAL EXAM
[Bilateral] : foot and ankle bilaterally [NL (40)] : plantar flexion 40 degrees [NL 30)] : inversion 30 degrees [NL (20)] : eversion 20 degrees [5___] : eversion 5[unfilled]/5 [2+] : dorsalis pedis pulse: 2+ [] : negative Tinel

## 2023-06-26 NOTE — HISTORY OF PRESENT ILLNESS
[8] : 8 [3] : 3 [Dull/Aching] : dull/aching [Localized] : localized [Tingling] : tingling [Constant] : constant [Physical therapy] : physical therapy [Walking] : walking [Full time] : Work status: full time [de-identified] : 06/12/2023: long standing plantar heel pain. no recent injury/treatment. in past has been to PT, has csi, orthotics. no prior foot surgery. denies dm/tob. kika azevedo. numbness at times\par \par 6/26/23: emg f/up -- no abnormality seen. has been to 2 sessions PT [] : Post Surgical Visit: no [FreeTextEntry1] : Tom heels

## 2023-09-11 ENCOUNTER — NON-APPOINTMENT (OUTPATIENT)
Age: 58
End: 2023-09-11

## 2023-12-21 ENCOUNTER — RX RENEWAL (OUTPATIENT)
Age: 58
End: 2023-12-21

## 2024-01-23 ENCOUNTER — RX ONLY (RX ONLY)
Age: 59
End: 2024-01-23

## 2024-01-23 ENCOUNTER — OFFICE (OUTPATIENT)
Dept: URBAN - METROPOLITAN AREA CLINIC 102 | Facility: CLINIC | Age: 59
Setting detail: OPHTHALMOLOGY
End: 2024-01-23
Payer: COMMERCIAL

## 2024-01-23 DIAGNOSIS — H16.223: ICD-10-CM

## 2024-01-23 DIAGNOSIS — H40.033: ICD-10-CM

## 2024-01-23 DIAGNOSIS — H01.004: ICD-10-CM

## 2024-01-23 DIAGNOSIS — H01.001: ICD-10-CM

## 2024-01-23 DIAGNOSIS — H01.005: ICD-10-CM

## 2024-01-23 DIAGNOSIS — H25.13: ICD-10-CM

## 2024-01-23 DIAGNOSIS — H01.002: ICD-10-CM

## 2024-01-23 PROCEDURE — 92004 COMPRE OPH EXAM NEW PT 1/>: CPT | Performed by: OPHTHALMOLOGY

## 2024-01-23 PROCEDURE — 92020 GONIOSCOPY: CPT | Performed by: OPHTHALMOLOGY

## 2024-01-23 ASSESSMENT — REFRACTION_AUTOREFRACTION
OS_AXIS: 77
OD_SPHERE: +0.25
OD_AXIS: 167
OD_CYLINDER: -0.75
OS_CYLINDER: -0.25
OS_SPHERE: -0.25

## 2024-01-23 ASSESSMENT — SPHEQUIV_DERIVED
OS_SPHEQUIV: -0.375
OD_SPHEQUIV: -0.125

## 2024-01-23 ASSESSMENT — CONFRONTATIONAL VISUAL FIELD TEST (CVF)
OD_FINDINGS: FULL
OS_FINDINGS: FULL

## 2024-01-23 ASSESSMENT — SUPERFICIAL PUNCTATE KERATITIS (SPK)
OD_SPK: 1+
OS_SPK: 1+

## 2024-01-23 ASSESSMENT — LID EXAM ASSESSMENTS
OD_BLEPHARITIS: RLL RUL 2+
OS_BLEPHARITIS: LLL LUL 2+

## 2024-03-18 ENCOUNTER — APPOINTMENT (OUTPATIENT)
Dept: ORTHOPEDIC SURGERY | Facility: CLINIC | Age: 59
End: 2024-03-18
Payer: COMMERCIAL

## 2024-03-18 VITALS — HEIGHT: 72 IN | WEIGHT: 205 LBS | BODY MASS INDEX: 27.77 KG/M2

## 2024-03-18 DIAGNOSIS — M17.12 UNILATERAL PRIMARY OSTEOARTHRITIS, LEFT KNEE: ICD-10-CM

## 2024-03-18 DIAGNOSIS — M25.562 PAIN IN LEFT KNEE: ICD-10-CM

## 2024-03-18 PROCEDURE — 20610 DRAIN/INJ JOINT/BURSA W/O US: CPT | Mod: LT

## 2024-03-18 PROCEDURE — J3490M: CUSTOM

## 2024-03-18 PROCEDURE — 73564 X-RAY EXAM KNEE 4 OR MORE: CPT | Mod: LT

## 2024-03-18 PROCEDURE — 99213 OFFICE O/P EST LOW 20 MIN: CPT | Mod: 25

## 2024-03-18 RX ORDER — NAPROXEN 500 MG/1
500 TABLET ORAL
Qty: 60 | Refills: 0 | Status: ACTIVE | COMMUNITY
Start: 2024-03-18 | End: 1900-01-01

## 2024-03-19 NOTE — HISTORY OF PRESENT ILLNESS
[de-identified] : The patient is a 58 year  old left  hand dominant male who presents today complaining of left knee pain. Date of Injury/Onset: 3/4/24 Pain:    At Rest: 0/10  With Activity:  8/10  Mechanism of injury: No NIKKI, gradual onset This is NOT a Work Related Injury being treated under Worker's Compensation. This is NOT an athletic injury occurring associated with an interscholastic or organized sports team. Quality of symptoms: sharp, stabbing anterior pain, swelling Improves with: voltaren gel, rest Worse with: kneeling, up and downstairs, lateral movements/twisting Prior treatment: none Prior Imaging: none Out of work/sport: _, since _ School/Sport/Position/Occupation:_ Additional Information: H/O left patella fracture, conservative treatment

## 2024-03-19 NOTE — IMAGING
[Left] : left knee [All Views] : anteroposterior, lateral, skyline, and anteroposterior standing [Mild patellofemoral OA] : Mild patellofemoral OA [FreeTextEntry9] : mild PF OA with associated spurring otherwise unremarkable.  [de-identified] : The patient is a well appearing 58 year  old male of their stated age. Patient ambulates with a normal gait. Negative straight leg raise bilateral   Effected Knee: LEFT                          ROM:  0-140 degrees Lachman: Negative Pivot Shift: Negative Anterior Drawer: Negative Posterior Drawer / Sag:Negative Varus Stress 0 degrees: Stable Varus Stress 30 degrees: Stable Valgus Stress 0 degrees: Stable Valgus Stress 30 degrees: Stable Medial Nata: Negative Lateral Nata: Negative Patella Glide: 2+ Patella Apprehension: Negative Patella Grind: POSITIVE    Palpation: Medial Joint Line: Nontender Lateral Joint Line: Nontender Medial Collateral Ligament: Nontender Lateral Collateral Ligament/PLC: Nontender Distal Femur: Nontender Proximal Tibia: Nontender Tibial Tubercle: Nontender Distal Pole Patella: Nontender Quadriceps Tendon: Nontender &  Intact Patella Tendon: Nontender &  Intact Medial Femoral Condyle: Nontender Medial Distal Hamstring/PES: Nontender Lateral Distal Hamstring: Nontender Vastus Medialis Oblique: Nontender  Iliotibial Band: Nontender & Intact Medial Patellofemoral Ligament: Nontender Adductor: Nontender Proximal GSC-Plantaris: Nontender Calf: Supple & Nontender   Inspection: Deformity: No Erythema: No Ecchymosis: No Abrasions: No Effusion: No Prepatella Bursitis: No Neurologic Exam: Sensation L4-S1: Grossly Intact Motor Exam: Quadriceps: 5 out of 5 Hamstrings: 5 out of 5 EHL: 5 out of 5 FHL: 5 out of 5 TA: 5 out of 5 GS: 5 out of 5 Circulatory/Pulses: Dorsalis Pedis: 2+ Posterior Tibialis: 2+ Additional Pertinent Findings: None   Contralateral Knee:                             ROM: 0-145 degrees Other Pertinent Findings: None   Assessment: The patient is a 58 year  old male  with left knee pain and radiographic and physical exam findings consistent with PF OA.   The patients condition is chronic Documents/Results Reviewed Today: X-Ray left knee  Tests/Studies Independently Interpreted Today: X-Ray left knee reveals mild PF OA with associated spurring otherwise unremarkable.  Pertinent findings include: +patella crepitus, 0-140  Confounding medical conditions/concerns: None   Plan: Discussed conservative treatments in the form of injections. Patient elected to receive left knee 9/1 CSI. Patient will start physical therapy, HEP, and stretching. Advised patient to obtain a Reparel knee sleeve. Prescribed patient Naprosyn 500mg BID x 2 weeks, then PRN for pain management and inflammation - use as directed. Modify activity as discussed.   Tests Ordered: None  Prescription Medications Ordered: Naprosyn 500mg  Braces/DME Ordered: None Activity/Work/Sports Status: None Additional Instructions: None Follow-Up: 6 weeks   Procedure Note: Musculoskeletal Injection  Diagnosis: Left knee PF OA Procedure:  Left knee, superolateral, 9/1 CSI   Indication:  The patient has had persistent pain despite conservative treatment.  Risks, benefits and alternatives to procedure were discussed; all questions were answered to the patient's apparent satisfaction and informed consent obtained.  The patient denied prior problems with local anesthetics, injectable cortisones, chicken allergy, coagulopathy and no relevant drug or preservative allergies or sensitivities.  The area of injection was prepared in a sterile fashion.  Prior to injection a 'Time Out' was conducted in accordance with [facility] policy and the site and nature of procedure verified with the patient.   Procedure:  The procedure was carried out utilizing sterile technique from a superolateral arthroscopic portal position.   0cc of clear synovial fluid was aspirated.  The specimen:  (X) appeared benign and was discarded  ( ) was sent for Culture / Cell Count / Crystal analysis / [_].]   Injection into the target area with care taken to aspirate frequently to minimize the risk of intravascular injection was performed with:  ( ) 1cc of Depomedrol (80mg/ml)  (X) 1cc of Dexamethasone (10mg/ml)  ( ) 1cc of Toradol (30mg/ml)  (X) 9cc of 0.5% Bupivacaine  ( ) 1cc of 1% Lidocaine  ( ) 5cc of 32mg Zilretta, prepared and diluted per  instructions  ( ) 2 cc of Hylan G-F 20 (Synvisc) 16mg/2ml  ( ) 6 cc of Hylan G-F 20 (SynvisoOne) 16mg/2ml  ( ) 2cc of Euflexxa  ( ) 2cc of Orthovisc  ( ) 2cc of GelOne  ( ) 3cc of Durolane (20mg/ml)   Patient tolerated the procedure well and direct pressure was applied for hemostasis. The patient was reminded of potential post-injection risks including, but not limited to, delayed hypersensitivity reactions and/or infection.  The patient verified that they had the office and the Emergency Room's contact information if any problems should arise.  After several minutes, the patient informed me that they felt fine and was released from the office.   The patient's current medication management of their orthopedic diagnosis was reviewed today: (1) We discussed a comprehensive treatment plan that included possible pharmaceutical management involving the use of prescription strength medications including but not limited to options such as oral Naprosyn 500mg BID, once daily Meloxicam 15 mg, or 500-650 mg Tylenol versus over the counter oral medications and topical prescription NSAID Pennsaid vs over the counter Voltaren gel.  Based on our extensive discussion, the patient was prescribed Naprosyn 500mg BID for two weeks.  It will then be used PRN for pain, inflammation and discomfort. (2) There is a moderate risk of morbidity with further treatment, especially from use of prescription strength medications and possible side effects of these medications which include upset stomach with oral medications, skin reactions to topical medications and cardiac/renal issues with long term use. (3) I recommended that the patient follow-up with their medical physician to discuss any significant specific potential issues with long term medication use such as interactions with current medications or with exacerbation of underlying medical comorbidities. (4) The benefits and risks associated with use of injectable, oral or topical, prescription and over the counter anti-inflammatory medications were discussed with the patient. The patient voiced understanding of the risks including but not limited to bleeding, stroke, kidney dysfunction, heart disease, and were referred to the black box warning label for further information.  I, Breanna Akers, attest that this documentation has been prepared under the direction and in the presence of Provider Dr. Alfredo Lopez.  The documentation recorded by the scribe accurately reflects the services IDr. [email or web address], personally performed and the decisions made by me.

## 2024-04-23 RX ORDER — NAPROXEN 500 MG/1
500 TABLET ORAL
Qty: 60 | Refills: 0 | Status: DISCONTINUED | COMMUNITY
Start: 2023-01-05 | End: 2024-04-23

## 2024-04-23 RX ORDER — SODIUM PICOSULFATE, MAGNESIUM OXIDE, AND ANHYDROUS CITRIC ACID 10; 3.5; 12 MG/160ML; G/160ML; G/160ML
10-3.5-12 MG-GM LIQUID ORAL
Qty: 2 | Refills: 0 | Status: DISCONTINUED | COMMUNITY
Start: 2020-08-27 | End: 2024-04-23

## 2024-04-29 ENCOUNTER — APPOINTMENT (OUTPATIENT)
Dept: ORTHOPEDIC SURGERY | Facility: CLINIC | Age: 59
End: 2024-04-29

## 2024-05-06 ENCOUNTER — NON-APPOINTMENT (OUTPATIENT)
Age: 59
End: 2024-05-06

## 2024-05-23 ENCOUNTER — RX RENEWAL (OUTPATIENT)
Age: 59
End: 2024-05-23

## 2024-05-23 ENCOUNTER — APPOINTMENT (OUTPATIENT)
Dept: DERMATOLOGY | Facility: CLINIC | Age: 59
End: 2024-05-23
Payer: COMMERCIAL

## 2024-05-23 DIAGNOSIS — L82.1 OTHER SEBORRHEIC KERATOSIS: ICD-10-CM

## 2024-05-23 DIAGNOSIS — D18.01 HEMANGIOMA OF SKIN AND SUBCUTANEOUS TISSUE: ICD-10-CM

## 2024-05-23 DIAGNOSIS — D22.9 MELANOCYTIC NEVI, UNSPECIFIED: ICD-10-CM

## 2024-05-23 DIAGNOSIS — L21.9 SEBORRHEIC DERMATITIS, UNSPECIFIED: ICD-10-CM

## 2024-05-23 PROCEDURE — 99213 OFFICE O/P EST LOW 20 MIN: CPT

## 2024-05-23 RX ORDER — VALACYCLOVIR 500 MG/1
500 TABLET, FILM COATED ORAL DAILY
Qty: 90 | Refills: 3 | Status: ACTIVE | COMMUNITY
Start: 2022-05-12 | End: 1900-01-01

## 2024-05-23 NOTE — PHYSICAL EXAM
[Alert] : alert [Oriented x 3] : ~L oriented x 3 [Well Nourished] : well nourished [Full Body Skin Exam Performed] : performed [FreeTextEntry3] : A full skin exam was performed including the scalp, face (including lips, ears, nose and eyes), neck, chest, abdomen, back, buttocks, upper extremities and lower extremities.  The exam revealed the following benign growths: Sweeny pigmented nevi - includes 11 mm patch, hyperpigmented, on the right lateral clavicle.  ELM bland as well. Seborrheic keratoses. Cherry angioma.  Scalp currently clear.

## 2024-05-23 NOTE — HISTORY OF PRESENT ILLNESS
[FreeTextEntry1] : Patient presents for skin examination. [de-identified] : Patient notes intermittent itching of the scalp.

## 2024-05-23 NOTE — ASSESSMENT
[FreeTextEntry1] : A complete skin examination was performed.  There is no evidence of skin cancer.  We discussed the importance of photoprotection, including the use of hats, protective clothing and sunscreens with an SPF of at least 30.  Sun avoidance was also discussed.  The ABCDE's of melanoma was discussed.  Regular skin exams recommended.  Nevus, right lateral clavicle, ? congenital.  Benign clinically and by ELM. Will follow.  Seb derm likely Try DHS-Zinc shampoo.

## 2024-06-10 ENCOUNTER — RX RENEWAL (OUTPATIENT)
Age: 59
End: 2024-06-10

## 2024-06-10 RX ORDER — DEXLANSOPRAZOLE 60 MG/1
60 CAPSULE, DELAYED RELEASE ORAL
Qty: 30 | Refills: 5 | Status: ACTIVE | COMMUNITY
Start: 2023-03-19 | End: 1900-01-01

## 2024-10-05 ENCOUNTER — NON-APPOINTMENT (OUTPATIENT)
Age: 59
End: 2024-10-05

## 2024-10-10 ENCOUNTER — APPOINTMENT (OUTPATIENT)
Dept: GASTROENTEROLOGY | Facility: CLINIC | Age: 59
End: 2024-10-10
Payer: COMMERCIAL

## 2024-10-10 VITALS
DIASTOLIC BLOOD PRESSURE: 80 MMHG | BODY MASS INDEX: 28.44 KG/M2 | SYSTOLIC BLOOD PRESSURE: 140 MMHG | WEIGHT: 210 LBS | HEIGHT: 72 IN

## 2024-10-10 DIAGNOSIS — Z09 ENCOUNTER FOR FOLLOW-UP EXAMINATION AFTER COMPLETED TREATMENT FOR CONDITIONS OTHER THAN MALIGNANT NEOPLASM: ICD-10-CM

## 2024-10-10 DIAGNOSIS — K21.9 GASTRO-ESOPHAGEAL REFLUX DISEASE W/OUT ESOPHAGITIS: ICD-10-CM

## 2024-10-10 PROCEDURE — 99212 OFFICE O/P EST SF 10 MIN: CPT

## 2024-12-03 ENCOUNTER — RX RENEWAL (OUTPATIENT)
Age: 59
End: 2024-12-03

## 2024-12-19 ENCOUNTER — APPOINTMENT (OUTPATIENT)
Dept: DERMATOLOGY | Facility: CLINIC | Age: 59
End: 2024-12-19

## 2024-12-23 ENCOUNTER — APPOINTMENT (OUTPATIENT)
Dept: DERMATOLOGY | Facility: CLINIC | Age: 59
End: 2024-12-23
Payer: COMMERCIAL

## 2024-12-23 DIAGNOSIS — L82.0 INFLAMED SEBORRHEIC KERATOSIS: ICD-10-CM

## 2024-12-23 DIAGNOSIS — D23.9 OTHER BENIGN NEOPLASM OF SKIN, UNSPECIFIED: ICD-10-CM

## 2024-12-23 PROCEDURE — 17110 DESTRUCTION B9 LES UP TO 14: CPT

## 2024-12-23 PROCEDURE — 99213 OFFICE O/P EST LOW 20 MIN: CPT | Mod: 25

## 2025-03-19 ENCOUNTER — TRANSCRIPTION ENCOUNTER (OUTPATIENT)
Age: 60
End: 2025-03-19

## 2025-03-20 ENCOUNTER — TRANSCRIPTION ENCOUNTER (OUTPATIENT)
Age: 60
End: 2025-03-20

## 2025-05-27 ENCOUNTER — RX RENEWAL (OUTPATIENT)
Age: 60
End: 2025-05-27

## 2025-06-05 ENCOUNTER — APPOINTMENT (OUTPATIENT)
Dept: DERMATOLOGY | Facility: CLINIC | Age: 60
End: 2025-06-05

## 2025-06-20 NOTE — ED STATDOCS - DR. NAME
Problem: Safety - Adult  Goal: Free from fall injury  Outcome: Progressing  Flowsheets (Taken 6/19/2025 2230)  Free From Fall Injury: Instruct family/caregiver on patient safety     Problem: Seizure Precautions  Goal: Remains free of injury related to seizures activity  Outcome: Progressing  Flowsheets (Taken 6/19/2025 2230)  Remains free of injury related to seizure activity:   Maintain airway, patient safety  and administer oxygen as ordered   Monitor patient for seizure activity, document and report duration and description of seizure to Licensed Independent Practitioner   If seizure occurs, turn patient to side and suction secretions as needed   Reorient patient post seizure   Seizure pads on all 4 side rails     Problem: Pain  Goal: Verbalizes/displays adequate comfort level or baseline comfort level  Outcome: Progressing  Flowsheets (Taken 6/19/2025 2230)  Verbalizes/displays adequate comfort level or baseline comfort level:   Assess pain using appropriate pain scale   Encourage patient to monitor pain and request assistance   Administer analgesics based on type and severity of pain and evaluate response     Problem: Chronic Conditions and Co-morbidities  Goal: Patient's chronic conditions and co-morbidity symptoms are monitored and maintained or improved  Outcome: Progressing  Flowsheets (Taken 6/19/2025 2230)  Care Plan - Patient's Chronic Conditions and Co-Morbidity Symptoms are Monitored and Maintained or Improved:   Monitor and assess patient's chronic conditions and comorbid symptoms for stability, deterioration, or improvement   Collaborate with multidisciplinary team to address chronic and comorbid conditions and prevent exacerbation or deterioration   Update acute care plan with appropriate goals if chronic or comorbid symptoms are exacerbated and prevent overall improvement and discharge     Problem: ABCDS Injury Assessment  Goal: Absence of physical injury  Outcome: Progressing  Flowsheets (Taken  Alyson

## 2025-06-24 ENCOUNTER — NON-APPOINTMENT (OUTPATIENT)
Age: 60
End: 2025-06-24

## 2025-06-25 ENCOUNTER — NON-APPOINTMENT (OUTPATIENT)
Age: 60
End: 2025-06-25

## 2025-06-26 ENCOUNTER — APPOINTMENT (OUTPATIENT)
Dept: DERMATOLOGY | Facility: CLINIC | Age: 60
End: 2025-06-26

## 2025-06-26 PROBLEM — D48.5 NEOPLASM OF UNCERTAIN BEHAVIOR OF SKIN: Status: ACTIVE | Noted: 2025-06-26

## 2025-06-26 PROCEDURE — 11102 TANGNTL BX SKIN SINGLE LES: CPT

## 2025-06-26 PROCEDURE — 99213 OFFICE O/P EST LOW 20 MIN: CPT | Mod: 25

## 2025-07-02 LAB — CORE LAB BIOPSY: NORMAL

## 2025-07-24 ENCOUNTER — APPOINTMENT (OUTPATIENT)
Dept: DERMATOLOGY | Facility: CLINIC | Age: 60
End: 2025-07-24
Payer: COMMERCIAL

## 2025-07-24 DIAGNOSIS — C44.310 BASAL CELL CARCINOMA OF SKIN OF UNSPECIFIED PARTS OF FACE: ICD-10-CM

## 2025-07-24 PROCEDURE — 17282 DSTR MAL LS F/E/E/N/L/M1.1-2: CPT
